# Patient Record
Sex: FEMALE | Race: WHITE | NOT HISPANIC OR LATINO | Employment: OTHER | URBAN - NONMETROPOLITAN AREA
[De-identification: names, ages, dates, MRNs, and addresses within clinical notes are randomized per-mention and may not be internally consistent; named-entity substitution may affect disease eponyms.]

---

## 2017-01-10 ENCOUNTER — TRANSFERRED RECORDS (OUTPATIENT)
Dept: HEALTH INFORMATION MANAGEMENT | Facility: OTHER | Age: 52
End: 2017-01-10

## 2017-01-18 ENCOUNTER — APPOINTMENT (RX ONLY)
Dept: URBAN - METROPOLITAN AREA OTHER 12 | Facility: OTHER | Age: 52
Setting detail: DERMATOLOGY
End: 2017-01-18

## 2017-01-18 DIAGNOSIS — L82.1 OTHER SEBORRHEIC KERATOSIS: ICD-10-CM

## 2017-01-18 DIAGNOSIS — D22 MELANOCYTIC NEVI: ICD-10-CM

## 2017-01-18 DIAGNOSIS — D485 NEOPLASM OF UNCERTAIN BEHAVIOR OF SKIN: ICD-10-CM

## 2017-01-18 DIAGNOSIS — L81.4 OTHER MELANIN HYPERPIGMENTATION: ICD-10-CM

## 2017-01-18 PROBLEM — D48.5 NEOPLASM OF UNCERTAIN BEHAVIOR OF SKIN: Status: ACTIVE | Noted: 2017-01-18

## 2017-01-18 PROBLEM — D22.9 MELANOCYTIC NEVI, UNSPECIFIED: Status: ACTIVE | Noted: 2017-01-18

## 2017-01-18 PROCEDURE — 11100: CPT

## 2017-01-18 PROCEDURE — ? TREATMENT REGIMEN

## 2017-01-18 PROCEDURE — 99203 OFFICE O/P NEW LOW 30 MIN: CPT | Mod: 25

## 2017-01-18 PROCEDURE — ? COUNSELING

## 2017-01-18 PROCEDURE — ? BIOPSY BY SHAVE METHOD

## 2017-01-18 ASSESSMENT — LOCATION DETAILED DESCRIPTION DERM
LOCATION DETAILED: RIGHT INFERIOR MEDIAL UPPER BACK
LOCATION DETAILED: RIGHT PROXIMAL PRETIBIAL REGION
LOCATION DETAILED: LEFT PROXIMAL PRETIBIAL REGION
LOCATION DETAILED: SUPERIOR LUMBAR SPINE

## 2017-01-18 ASSESSMENT — LOCATION SIMPLE DESCRIPTION DERM
LOCATION SIMPLE: LOWER BACK
LOCATION SIMPLE: RIGHT PRETIBIAL REGION
LOCATION SIMPLE: LEFT PRETIBIAL REGION
LOCATION SIMPLE: RIGHT UPPER BACK

## 2017-01-18 ASSESSMENT — LOCATION ZONE DERM
LOCATION ZONE: LEG
LOCATION ZONE: TRUNK

## 2017-01-18 NOTE — PROCEDURE: TREATMENT REGIMEN
Otc Regimen: CeraVe cream daily (coupon given)
Plan: Recommended annual skin exams or sooner if any changes noted
Detail Level: Generalized

## 2017-01-18 NOTE — HPI: FULL BODY SKIN EXAMINATION
What Is The Reason For Today's Visit?: Full Body Skin Examination
What Is The Reason For Today's Visit? (Being Monitored For X): the development of new lesions
Additional History: Patient stated she has no lesions of concern at this time

## 2017-01-18 NOTE — PROCEDURE: BIOPSY BY SHAVE METHOD
Render Post-Care Instructions In Note?: no
X Size Of Lesion In Cm: 0
Silver Nitrate Text: The wound bed was treated with silver nitrate after the biopsy was performed.
Hemostasis: Drysol
Cryotherapy Text: The wound bed was treated with cryotherapy after the biopsy was performed.
Wound Care: Vaseline
Notification Instructions: Patient will be notified of biopsy results. However, patient instructed to call the office if not contacted within 2 weeks.
Electrodesiccation Text: The wound bed was treated with electrodesiccation after the biopsy was performed.
Dressing: bandage
Biopsy Method: Personna blade
Anesthesia Type: 2% lidocaine with epinephrine and a 1:10 solution of 8.4% sodium bicarbonate
Lab: [id number]
Detail Level: Detailed
Billing Type: Third-Party Bill
Consent: Written consent was obtained and risks were reviewed including but not limited to scarring, infection, bleeding, scabbing, incomplete removal, nerve damage and allergy to anesthesia.
Anesthesia Volume In Cc: 1.4
Curettage Text: The wound bed was treated with curettage after the biopsy was performed.
Biopsy Type: H and E
Type Of Destruction Used: Curettage
Post-Care Instructions: I reviewed with the patient in detail post-care instructions. Patient is to keep the biopsy site dry overnight, and then apply Vaseline and band aid until healed.
Lab Facility: [id number]
Electrodesiccation And Curettage Text: The wound bed was treated with electrodesiccation and curettage after the biopsy was performed.

## 2017-02-01 ENCOUNTER — TRANSFERRED RECORDS (OUTPATIENT)
Dept: HEALTH INFORMATION MANAGEMENT | Facility: OTHER | Age: 52
End: 2017-02-01

## 2017-02-20 ENCOUNTER — TRANSFERRED RECORDS (OUTPATIENT)
Dept: HEALTH INFORMATION MANAGEMENT | Facility: OTHER | Age: 52
End: 2017-02-20

## 2017-04-25 ENCOUNTER — APPOINTMENT (RX ONLY)
Dept: URBAN - METROPOLITAN AREA OTHER 12 | Facility: OTHER | Age: 52
Setting detail: DERMATOLOGY
End: 2017-04-25

## 2017-04-25 DIAGNOSIS — Z87.2 PERSONAL HISTORY OF DISEASES OF THE SKIN AND SUBCUTANEOUS TISSUE: ICD-10-CM

## 2017-04-25 PROCEDURE — 99212 OFFICE O/P EST SF 10 MIN: CPT

## 2017-04-25 ASSESSMENT — LOCATION DETAILED DESCRIPTION DERM: LOCATION DETAILED: SUPERIOR LUMBAR SPINE

## 2017-04-25 ASSESSMENT — LOCATION ZONE DERM: LOCATION ZONE: TRUNK

## 2017-04-25 ASSESSMENT — LOCATION SIMPLE DESCRIPTION DERM: LOCATION SIMPLE: LOWER BACK

## 2017-12-08 ENCOUNTER — APPOINTMENT (RX ONLY)
Dept: URBAN - METROPOLITAN AREA OTHER 12 | Facility: OTHER | Age: 52
Setting detail: DERMATOLOGY
End: 2017-12-08

## 2017-12-08 DIAGNOSIS — D485 NEOPLASM OF UNCERTAIN BEHAVIOR OF SKIN: ICD-10-CM

## 2017-12-08 DIAGNOSIS — D22 MELANOCYTIC NEVI: ICD-10-CM

## 2017-12-08 PROBLEM — D22.5 MELANOCYTIC NEVI OF TRUNK: Status: ACTIVE | Noted: 2017-12-08

## 2017-12-08 PROBLEM — D48.5 NEOPLASM OF UNCERTAIN BEHAVIOR OF SKIN: Status: ACTIVE | Noted: 2017-12-08

## 2017-12-08 PROCEDURE — ? COUNSELING

## 2017-12-08 PROCEDURE — ? BIOPSY BY SHAVE METHOD

## 2017-12-08 PROCEDURE — 99212 OFFICE O/P EST SF 10 MIN: CPT | Mod: 25

## 2017-12-08 PROCEDURE — 11100: CPT

## 2017-12-08 ASSESSMENT — LOCATION SIMPLE DESCRIPTION DERM
LOCATION SIMPLE: ABDOMEN
LOCATION SIMPLE: UPPER BACK

## 2017-12-08 ASSESSMENT — LOCATION DETAILED DESCRIPTION DERM
LOCATION DETAILED: PERIUMBILICAL SKIN
LOCATION DETAILED: INFERIOR THORACIC SPINE

## 2017-12-08 ASSESSMENT — LOCATION ZONE DERM: LOCATION ZONE: TRUNK

## 2017-12-08 NOTE — PROCEDURE: BIOPSY BY SHAVE METHOD
Type Of Destruction Used: Curettage
Dressing: bandage
Additional Anesthesia Volume In Cc (Will Not Render If 0): 0
Anesthesia Type: 1% lidocaine with epinephrine and a 1:10 solution of 8.4% sodium bicarbonate
Destruction After The Procedure: No
Biopsy Type: H and E
Notification Instructions: Patient will be notified of biopsy results. However, patient instructed to call the office if not contacted within 2 weeks.
Biopsy Method: Personna blade
Cryotherapy Text: The wound bed was treated with cryotherapy after the biopsy was performed.
Lab: -[id number]
Post-Care Instructions: I reviewed with the patient in detail post-care instructions. Patient is to keep the biopsy site dry overnight, and then apply Vaseline and band aid until healed.
Hemostasis: Drysol
Billing Type: Third-Party Bill
Electrodesiccation And Curettage Text: The wound bed was treated with electrodesiccation and curettage after the biopsy was performed.
Curettage Text: The wound bed was treated with curettage after the biopsy was performed.
Silver Nitrate Text: The wound bed was treated with silver nitrate after the biopsy was performed.
Wound Care: Vaseline
Detail Level: Detailed
Consent: Written consent was obtained and risks were reviewed including but not limited to scarring, infection, bleeding, scabbing, incomplete removal, nerve damage and allergy to anesthesia.
Anesthesia Volume In Cc: 1.4
Electrodesiccation Text: The wound bed was treated with electrodesiccation after the biopsy was performed.
Lab Facility: [id number]

## 2017-12-08 NOTE — HPI: FULL BODY SKIN EXAMINATION
How Severe Are Your Spot(S)?: mild
What Is The Reason For Today's Visit?: Full Body Skin Examination
What Is The Reason For Today's Visit? (Being Monitored For X): surveillance against the recurrence of atypical nevi
Additional History: Patient states she had knee replacement surgery on both knees and area on her right knee would bleed and become bothersome, but has since healed and left a scar.

## 2018-01-01 ENCOUNTER — TRANSFERRED RECORDS (OUTPATIENT)
Dept: MULTI SPECIALTY CLINIC | Facility: CLINIC | Age: 53
End: 2018-01-01

## 2018-02-21 ENCOUNTER — TRANSFERRED RECORDS (OUTPATIENT)
Dept: HEALTH INFORMATION MANAGEMENT | Facility: OTHER | Age: 53
End: 2018-02-21

## 2019-02-25 ENCOUNTER — TRANSFERRED RECORDS (OUTPATIENT)
Dept: HEALTH INFORMATION MANAGEMENT | Facility: OTHER | Age: 54
End: 2019-02-25

## 2020-03-06 ENCOUNTER — TRANSFERRED RECORDS (OUTPATIENT)
Dept: HEALTH INFORMATION MANAGEMENT | Facility: OTHER | Age: 55
End: 2020-03-06

## 2020-08-17 ENCOUNTER — TRANSFERRED RECORDS (OUTPATIENT)
Dept: HEALTH INFORMATION MANAGEMENT | Facility: OTHER | Age: 55
End: 2020-08-17

## 2020-08-19 ENCOUNTER — APPOINTMENT (RX ONLY)
Dept: URBAN - METROPOLITAN AREA OTHER 11 | Facility: OTHER | Age: 55
Setting detail: DERMATOLOGY
End: 2020-08-19

## 2020-08-19 DIAGNOSIS — Z12.83 ENCOUNTER FOR SCREENING FOR MALIGNANT NEOPLASM OF SKIN: ICD-10-CM

## 2020-08-19 DIAGNOSIS — D22 MELANOCYTIC NEVI: ICD-10-CM

## 2020-08-19 DIAGNOSIS — L72.0 EPIDERMAL CYST: ICD-10-CM

## 2020-08-19 DIAGNOSIS — D18.0 HEMANGIOMA: ICD-10-CM

## 2020-08-19 DIAGNOSIS — D485 NEOPLASM OF UNCERTAIN BEHAVIOR OF SKIN: ICD-10-CM

## 2020-08-19 DIAGNOSIS — L82.1 OTHER SEBORRHEIC KERATOSIS: ICD-10-CM

## 2020-08-19 PROBLEM — D18.01 HEMANGIOMA OF SKIN AND SUBCUTANEOUS TISSUE: Status: ACTIVE | Noted: 2020-08-19

## 2020-08-19 PROBLEM — D22.9 MELANOCYTIC NEVI, UNSPECIFIED: Status: ACTIVE | Noted: 2020-08-19

## 2020-08-19 PROBLEM — D48.5 NEOPLASM OF UNCERTAIN BEHAVIOR OF SKIN: Status: ACTIVE | Noted: 2020-08-19

## 2020-08-19 PROCEDURE — 99214 OFFICE O/P EST MOD 30 MIN: CPT | Mod: 25

## 2020-08-19 PROCEDURE — 11102 TANGNTL BX SKIN SINGLE LES: CPT

## 2020-08-19 PROCEDURE — ? BIOPSY BY SHAVE METHOD

## 2020-08-19 PROCEDURE — 11103 TANGNTL BX SKIN EA SEP/ADDL: CPT

## 2020-08-19 PROCEDURE — ? COUNSELING

## 2020-08-19 ASSESSMENT — LOCATION DETAILED DESCRIPTION DERM
LOCATION DETAILED: RIGHT CENTRAL FRONTAL SCALP
LOCATION DETAILED: LEFT DORSAL FOOT
LOCATION DETAILED: LEFT ANTERIOR AXILLA
LOCATION DETAILED: RIGHT SUPERIOR UPPER BACK
LOCATION DETAILED: RIGHT LATERAL ABDOMEN
LOCATION DETAILED: EPIGASTRIC SKIN
LOCATION DETAILED: LEFT INFERIOR MEDIAL MALAR CHEEK
LOCATION DETAILED: RIGHT INFERIOR LATERAL LOWER BACK
LOCATION DETAILED: LEFT INFERIOR MEDIAL LOWER BACK

## 2020-08-19 ASSESSMENT — LOCATION ZONE DERM
LOCATION ZONE: SCALP
LOCATION ZONE: FEET
LOCATION ZONE: AXILLAE
LOCATION ZONE: FACE
LOCATION ZONE: TRUNK

## 2020-08-19 ASSESSMENT — LOCATION SIMPLE DESCRIPTION DERM
LOCATION SIMPLE: LEFT FOOT
LOCATION SIMPLE: RIGHT UPPER BACK
LOCATION SIMPLE: ABDOMEN
LOCATION SIMPLE: SCALP
LOCATION SIMPLE: LEFT ANTERIOR AXILLA
LOCATION SIMPLE: LEFT CHEEK
LOCATION SIMPLE: LEFT LOWER BACK
LOCATION SIMPLE: RIGHT LOWER BACK

## 2021-02-12 ENCOUNTER — APPOINTMENT (RX ONLY)
Dept: URBAN - METROPOLITAN AREA OTHER 11 | Facility: OTHER | Age: 56
Setting detail: DERMATOLOGY
End: 2021-02-12

## 2021-02-12 DIAGNOSIS — H02.6 XANTHELASMA OF EYELID: ICD-10-CM

## 2021-02-12 DIAGNOSIS — L82.1 OTHER SEBORRHEIC KERATOSIS: ICD-10-CM

## 2021-02-12 DIAGNOSIS — D18.0 HEMANGIOMA: ICD-10-CM

## 2021-02-12 DIAGNOSIS — D22 MELANOCYTIC NEVI: ICD-10-CM

## 2021-02-12 PROBLEM — D18.01 HEMANGIOMA OF SKIN AND SUBCUTANEOUS TISSUE: Status: ACTIVE | Noted: 2021-02-12

## 2021-02-12 PROBLEM — H02.64 XANTHELASMA OF LEFT UPPER EYELID: Status: ACTIVE | Noted: 2021-02-12

## 2021-02-12 PROBLEM — D22.5 MELANOCYTIC NEVI OF TRUNK: Status: ACTIVE | Noted: 2021-02-12

## 2021-02-12 PROCEDURE — ? TREATMENT REGIMEN

## 2021-02-12 PROCEDURE — ? OTHER

## 2021-02-12 PROCEDURE — 99213 OFFICE O/P EST LOW 20 MIN: CPT

## 2021-02-12 PROCEDURE — ? COUNSELING

## 2021-02-12 ASSESSMENT — LOCATION SIMPLE DESCRIPTION DERM
LOCATION SIMPLE: RIGHT BREAST
LOCATION SIMPLE: LEFT SUPERIOR EYELID
LOCATION SIMPLE: LEFT BREAST
LOCATION SIMPLE: ABDOMEN
LOCATION SIMPLE: LEFT UPPER ARM
LOCATION SIMPLE: LEFT LOWER BACK
LOCATION SIMPLE: UPPER BACK

## 2021-02-12 ASSESSMENT — LOCATION DETAILED DESCRIPTION DERM
LOCATION DETAILED: LEFT INFRAMAMMARY CREASE (INNER QUADRANT)
LOCATION DETAILED: LEFT SUPERIOR LATERAL MIDBACK
LOCATION DETAILED: SUPERIOR THORACIC SPINE
LOCATION DETAILED: PERIUMBILICAL SKIN
LOCATION DETAILED: LEFT ANTERIOR PROXIMAL UPPER ARM
LOCATION DETAILED: LEFT MEDIAL SUPERIOR EYELID
LOCATION DETAILED: RIGHT INFRAMAMMARY CREASE (INNER QUADRANT)

## 2021-02-12 ASSESSMENT — LOCATION ZONE DERM
LOCATION ZONE: ARM
LOCATION ZONE: EYELID
LOCATION ZONE: TRUNK

## 2021-02-12 NOTE — PROCEDURE: OTHER
Note Text (......Xxx Chief Complaint.): This diagnosis correlates with the
Render Risk Assessment In Note?: no
Other (Free Text): Follow up with PCP to monitor cholesterol
Detail Level: Zone

## 2021-02-15 ENCOUNTER — TRANSFERRED RECORDS (OUTPATIENT)
Dept: HEALTH INFORMATION MANAGEMENT | Facility: OTHER | Age: 56
End: 2021-02-15

## 2021-03-29 ENCOUNTER — TRANSFERRED RECORDS (OUTPATIENT)
Dept: HEALTH INFORMATION MANAGEMENT | Facility: OTHER | Age: 56
End: 2021-03-29

## 2021-05-19 ENCOUNTER — TRANSFERRED RECORDS (OUTPATIENT)
Dept: HEALTH INFORMATION MANAGEMENT | Facility: OTHER | Age: 56
End: 2021-05-19

## 2021-05-19 ENCOUNTER — TRANSFERRED RECORDS (OUTPATIENT)
Dept: MULTI SPECIALTY CLINIC | Facility: CLINIC | Age: 56
End: 2021-05-19

## 2021-05-19 LAB — PAP SMEAR - HIM PATIENT REPORTED: NEGATIVE

## 2021-09-13 ENCOUNTER — MYC MEDICAL ADVICE (OUTPATIENT)
Dept: FAMILY MEDICINE | Facility: OTHER | Age: 56
End: 2021-09-13

## 2021-09-13 ENCOUNTER — OFFICE VISIT (OUTPATIENT)
Dept: FAMILY MEDICINE | Facility: OTHER | Age: 56
End: 2021-09-13
Payer: MEDICAID

## 2021-09-13 VITALS
DIASTOLIC BLOOD PRESSURE: 86 MMHG | SYSTOLIC BLOOD PRESSURE: 132 MMHG | RESPIRATION RATE: 18 BRPM | BODY MASS INDEX: 33.71 KG/M2 | OXYGEN SATURATION: 99 % | HEIGHT: 67 IN | HEART RATE: 88 BPM | TEMPERATURE: 97.6 F | WEIGHT: 214.8 LBS

## 2021-09-13 DIAGNOSIS — Z00.00 ROUTINE GENERAL MEDICAL EXAMINATION AT A HEALTH CARE FACILITY: Primary | ICD-10-CM

## 2021-09-13 DIAGNOSIS — N95.1 MENOPAUSAL SYNDROME (HOT FLASHES): ICD-10-CM

## 2021-09-13 PROCEDURE — G0463 HOSPITAL OUTPT CLINIC VISIT: HCPCS | Performed by: NURSE PRACTITIONER

## 2021-09-13 PROCEDURE — 99202 OFFICE O/P NEW SF 15 MIN: CPT | Performed by: NURSE PRACTITIONER

## 2021-09-13 RX ORDER — DROSPIRENONE AND ESTRADIOL 1; .5 MG/1; MG/1
1 TABLET, FILM COATED ORAL DAILY
Qty: 90 TABLET | Refills: 3 | Status: SHIPPED | OUTPATIENT
Start: 2021-09-13 | End: 2022-05-20

## 2021-09-13 RX ORDER — ACYCLOVIR 400 MG/1
TABLET ORAL
COMMUNITY
End: 2022-05-20

## 2021-09-13 RX ORDER — TERCONAZOLE 8 MG/G
CREAM VAGINAL
COMMUNITY
Start: 2021-07-22 | End: 2022-07-27

## 2021-09-13 RX ORDER — DROSPIRENONE AND ESTRADIOL 1; .5 MG/1; MG/1
TABLET, FILM COATED ORAL
COMMUNITY
Start: 2021-09-13 | End: 2021-09-13

## 2021-09-13 RX ORDER — CLOTRIMAZOLE AND BETAMETHASONE DIPROPIONATE 10; .64 MG/G; MG/G
CREAM TOPICAL PRN
COMMUNITY
Start: 2021-07-22 | End: 2022-07-27

## 2021-09-13 ASSESSMENT — PAIN SCALES - GENERAL: PAINLEVEL: NO PAIN (0)

## 2021-09-13 ASSESSMENT — MIFFLIN-ST. JEOR: SCORE: 1601.96

## 2021-09-13 NOTE — PROGRESS NOTES
HPI:    Johanna Cm is a 56 year old female who presents to clinic today for for clinic visit to establish care.  She also needs medication refills today.  She moved from [place] to [place] in June 2021 with her  for [employer].  She reports her last physical exam was just prior to her moved to [place], likely in May.  She had a mammogram Pap smear at that time.  These were normal.  Last colonoscopy was at age 51 and was normal.  She does not get routine flu shot or Covid vaccines.  She is unsure when her last tetanus shot was.  She states she has not had her menses for 3 months.  She is currently taking Angeliq for menopause symptoms.  She will request her medical records from [place].    History reviewed. No pertinent past medical history.    Past Surgical History:   Procedure Laterality Date     BILATERAL KNEE ARTHROSCOPY Bilateral      right knee arthropscopy Right        Family History   Problem Relation Age of Onset     No Known Problems Mother      No Known Problems Father      No Known Problems Sister      Hypertension Brother      Heart Disease Other        Social History     Socioeconomic History     Marital status:      Spouse name: Not on file     Number of children: Not on file     Years of education: Not on file     Highest education level: Not on file   Occupational History     Not on file   Tobacco Use     Smoking status: Never Smoker     Smokeless tobacco: Never Used   Vaping Use     Vaping Use: Never used   Substance and Sexual Activity     Alcohol use: Yes     Drug use: Not on file     Sexual activity: Yes     Partners: Male   Other Topics Concern     Not on file   Social History Narrative    , retired. 1 biological son. 3 step children     Social Determinants of Health     Financial Resource Strain:      Difficulty of Paying Living Expenses:    Food Insecurity:      Worried About Running Out of Food in the Last Year:      Ran Out of Food in the Last Year:   "  Transportation Needs:      Lack of Transportation (Medical):      Lack of Transportation (Non-Medical):    Physical Activity:      Days of Exercise per Week:      Minutes of Exercise per Session:    Stress:      Feeling of Stress :    Social Connections:      Frequency of Communication with Friends and Family:      Frequency of Social Gatherings with Friends and Family:      Attends [employer] Services:      Active Member of Clubs or Organizations:      Attends Club or Organization Meetings:      Marital Status:    Intimate Partner Violence:      Fear of Current or Ex-Partner:      Emotionally Abused:      Physically Abused:      Sexually Abused:        Current Outpatient Medications   Medication Sig Dispense Refill     acyclovir (ZOVIRAX) 400 MG tablet        clotrimazole-betamethasone (LOTRISONE) 1-0.05 % external cream apply TO affected area two TO three TIMES DAILY       drospirenone-estradiol (ANGELIQ) 0.5-1 MG tablet Take 1 tablet by mouth daily 90 tablet 3     terconazole (TERAZOL 3) 0.8 % vaginal cream place one applicator-full into THE VAGINA AT BEDTIME FOR 3 DAYS         Allergies   Allergen Reactions     Cats      Dust Mites        ROS:  Pertinent positives and negatives are noted in HPI.    EXAM:  /86   Pulse 88   Temp 97.6  F (36.4  C) (Tympanic)   Resp 18   Ht 1.702 m (5' 7\")   Wt 97.4 kg (214 lb 12.8 oz)   SpO2 99%   Breastfeeding No   BMI 33.64 kg/m    General appearance: well appearing female, in no acute distress  Head: normocephalic, atraumatic  Ears: TM's with cone of light, no erythema, canals clear bilaterally  Eyes: conjunctivae normal  Psychological: normal affect, alert and pleasant    ASSESSMENT AND PLAN:    1. Routine general medical examination at a health care facility    2. Menopausal syndrome (hot flashes)      Release information forms were completed to obtain her medical records from [place].  Refill medication completed per request today.  She will follow-up in May for " routine physical, sooner if any concerns.      MANUEL Ruiz CNP..................9/13/2021 8:50 AM

## 2021-09-13 NOTE — NURSING NOTE
Pt presents to clinic today for annual physical and Establish care. Pt is up to date on her mammogram and pap smears.       Medication Reconciliation: complete  Alyssa Mak LPN

## 2021-09-22 ENCOUNTER — OFFICE VISIT (OUTPATIENT)
Dept: FAMILY MEDICINE | Facility: OTHER | Age: 56
End: 2021-09-22
Attending: PHYSICIAN ASSISTANT
Payer: MEDICAID

## 2021-09-22 VITALS
WEIGHT: 211 LBS | BODY MASS INDEX: 33.05 KG/M2 | TEMPERATURE: 97.4 F | SYSTOLIC BLOOD PRESSURE: 110 MMHG | DIASTOLIC BLOOD PRESSURE: 70 MMHG | OXYGEN SATURATION: 98 % | HEART RATE: 64 BPM | RESPIRATION RATE: 18 BRPM

## 2021-09-22 DIAGNOSIS — R07.89 CHEST WALL PAIN: Primary | ICD-10-CM

## 2021-09-22 PROCEDURE — G0463 HOSPITAL OUTPT CLINIC VISIT: HCPCS | Performed by: FAMILY MEDICINE

## 2021-09-22 PROCEDURE — 99213 OFFICE O/P EST LOW 20 MIN: CPT | Performed by: FAMILY MEDICINE

## 2021-09-22 ASSESSMENT — PAIN SCALES - GENERAL: PAINLEVEL: MILD PAIN (2)

## 2021-09-22 NOTE — NURSING NOTE
"Patient presents to the clinic for left side chest/rib/shoulder pain on and off over the past 5 days.  Unable to locate [place] facility, cardiac history present.      FOOD SECURITY SCREENING QUESTIONS  Hunger Vital Signs:  Within the past 12 months we worried whether our food would run out before we got money to buy more. Never  Within the past 12 months the food we bought just didn't last and we didn't have money to get more. Never    Advance Care Directive on file? no  Advance Care Directive provided to patient? Declined.    Chief Complaint   Patient presents with     Musculoskeletal Problem       Initial /70 (BP Location: Right arm, Patient Position: Sitting, Cuff Size: Adult Large)   Pulse 64   Temp 97.4  F (36.3  C) (Temporal)   Resp 18   Wt 95.7 kg (211 lb)   SpO2 98%   BMI 33.05 kg/m   Estimated body mass index is 33.05 kg/m  as calculated from the following:    Height as of 9/13/21: 1.702 m (5' 7\").    Weight as of this encounter: 95.7 kg (211 lb).  Medication Reconciliation: complete    Marli Sandoval LPN       "

## 2021-09-22 NOTE — PROGRESS NOTES
"Nursing Notes:   Marli Sandoval LPN  9/22/2021  2:37 PM  Signed  Patient presents to the clinic for left side chest/rib/shoulder pain on and off over the past 5 days.  Unable to locate [place] facility, cardiac history present.      FOOD SECURITY SCREENING QUESTIONS  Hunger Vital Signs:  Within the past 12 months we worried whether our food would run out before we got money to buy more. Never  Within the past 12 months the food we bought just didn't last and we didn't have money to get more. Never    Advance Care Directive on file? no  Advance Care Directive provided to patient? Declined.    Chief Complaint   Patient presents with     Musculoskeletal Problem       Initial /70 (BP Location: Right arm, Patient Position: Sitting, Cuff Size: Adult Large)   Pulse 64   Temp 97.4  F (36.3  C) (Temporal)   Resp 18   Wt 95.7 kg (211 lb)   SpO2 98%   BMI 33.05 kg/m   Estimated body mass index is 33.05 kg/m  as calculated from the following:    Height as of 9/13/21: 1.702 m (5' 7\").    Weight as of this encounter: 95.7 kg (211 lb).  Medication Reconciliation: complete    Marli Sandoval LPN            Assessment & Plan       ICD-10-CM    1. Chest wall pain  R07.89        Chest pain to palpation, fitting for chest wall pain  Likely aggravated lifting items due to moved from [place].  Does not at all seem cardiac. Therefore EKG not obtained today  Recommend trying naproxen for and 40 mg twice daily for 5 days to see if this reduces pain.    Incidentally mentioned \"all my friends are dying of COVID.\" She is not vaccinated. I recommended mitigation measures like masking if she chooses against vaccination given that hospitals are full currently    Follow up as needed     Gigi James MD     [facility]      SUBJECTIVE:  56 year old female presents for left dull pain for 5 days.  Worse with a deep breath  Sometimes has no pain  More pain with lifting  No cough, fever, SOB  Pain not specifically " with exertion, but with arm or torso use      REVIEW OF SYSTEMS:    Pertinent items are noted in HPI.    Current Outpatient Medications   Medication Sig Dispense Refill     acyclovir (ZOVIRAX) 400 MG tablet        clotrimazole-betamethasone (LOTRISONE) 1-0.05 % external cream apply TO affected area two TO three TIMES DAILY       drospirenone-estradiol (ANGELIQ) 0.5-1 MG tablet Take 1 tablet by mouth daily 90 tablet 3     terconazole (TERAZOL 3) 0.8 % vaginal cream place one applicator-full into THE VAGINA AT BEDTIME FOR 3 DAYS       Allergies   Allergen Reactions     Cats      Dust Mites        OBJECTIVE:  /70 (BP Location: Right arm, Patient Position: Sitting, Cuff Size: Adult Large)   Pulse 64   Temp 97.4  F (36.3  C) (Temporal)   Resp 18   Wt 95.7 kg (211 lb)   SpO2 98%   BMI 33.05 kg/m      EXAM:  General Appearance: Alert. No acute distress  Chest/Respiratory Exam: Clear to auscultation bilaterally  Cardiovascular Exam: Regular rate and rhythm. S1, S2, no murmur, gallop, or rubs.  Musculoskeletal: Tender to palpation over left eighth and ninth ribs under breast. No pain with compression of sternum. No pain with compression of ribs laterally towards midline. Minimal pain with twisting of torso. Pain noticed with engagement of pectoralis and latissimus dorsi muscles.  Extremities: 2+ pedal pulses.  No lower extremity edema.  Psychiatric: Normal affect and mentation

## 2021-10-17 ENCOUNTER — HEALTH MAINTENANCE LETTER (OUTPATIENT)
Age: 56
End: 2021-10-17

## 2022-04-07 ENCOUNTER — HOSPITAL ENCOUNTER (OUTPATIENT)
Dept: MAMMOGRAPHY | Facility: OTHER | Age: 57
Discharge: HOME | End: 2022-04-07
Attending: NURSE PRACTITIONER | Admitting: NURSE PRACTITIONER
Payer: COMMERCIAL

## 2022-04-07 DIAGNOSIS — Z12.31 VISIT FOR SCREENING MAMMOGRAM: ICD-10-CM

## 2022-04-07 PROCEDURE — 77067 SCR MAMMO BI INCL CAD: CPT

## 2022-04-21 ENCOUNTER — E-VISIT (OUTPATIENT)
Dept: FAMILY MEDICINE | Facility: OTHER | Age: 57
End: 2022-04-21
Attending: NURSE PRACTITIONER
Payer: COMMERCIAL

## 2022-04-21 DIAGNOSIS — J01.90 ACUTE SINUSITIS WITH SYMPTOMS > 10 DAYS: Primary | ICD-10-CM

## 2022-04-21 PROCEDURE — 99421 OL DIG E/M SVC 5-10 MIN: CPT | Performed by: FAMILY MEDICINE

## 2022-04-21 NOTE — PATIENT INSTRUCTIONS
Dear Johanna Cm    After reviewing your responses, I've been able to diagnose you with?a sinus infection caused by bacteria.?     Based on your responses and diagnosis, I have prescribed Aygmentin to treat your symptoms. I have sent this to your pharmacy.?     It is also important to stay well hydrated, get lots of rest and take over-the-counter decongestants,?tylenol?or ibuprofen if you?are able to?take those medications per your primary care provider to help relieve discomfort.?     It is important that you take?all of?your prescribed medication even if your symptoms are improving after a few doses.? Taking?all of?your medicine helps prevent the symptoms from returning.?     If your symptoms worsen, you develop severe headache, vomiting, high fever (>102), or are not improving in 7 days, please contact your primary care provider for an appointment or visit any of our convenient Walk-in Care or Urgent Care Centers to be seen which can be found on our website?here.?     Thanks again for choosing?us?as your health care partner,?   ?  Ryan Ruvalcaba MD?

## 2022-05-20 ENCOUNTER — OFFICE VISIT (OUTPATIENT)
Dept: FAMILY MEDICINE | Facility: OTHER | Age: 57
End: 2022-05-20
Attending: NURSE PRACTITIONER
Payer: COMMERCIAL

## 2022-05-20 VITALS
TEMPERATURE: 97.6 F | SYSTOLIC BLOOD PRESSURE: 102 MMHG | BODY MASS INDEX: 35.47 KG/M2 | WEIGHT: 226 LBS | OXYGEN SATURATION: 98 % | HEIGHT: 67 IN | RESPIRATION RATE: 18 BRPM | DIASTOLIC BLOOD PRESSURE: 84 MMHG | HEART RATE: 80 BPM

## 2022-05-20 DIAGNOSIS — B00.1 RECURRENT COLD SORES: ICD-10-CM

## 2022-05-20 DIAGNOSIS — L98.9 SKIN LESION: ICD-10-CM

## 2022-05-20 DIAGNOSIS — R20.0 NUMBNESS OF TOES: ICD-10-CM

## 2022-05-20 DIAGNOSIS — Z13.29 SCREENING FOR THYROID DISORDER: ICD-10-CM

## 2022-05-20 DIAGNOSIS — E66.812 CLASS 2 OBESITY WITHOUT SERIOUS COMORBIDITY WITH BODY MASS INDEX (BMI) OF 35.0 TO 35.9 IN ADULT, UNSPECIFIED OBESITY TYPE: ICD-10-CM

## 2022-05-20 DIAGNOSIS — Z00.00 ROUTINE HISTORY AND PHYSICAL EXAMINATION OF ADULT: Primary | ICD-10-CM

## 2022-05-20 DIAGNOSIS — Z13.220 LIPID SCREENING: ICD-10-CM

## 2022-05-20 DIAGNOSIS — N95.1 MENOPAUSAL SYNDROME (HOT FLASHES): ICD-10-CM

## 2022-05-20 LAB
ALBUMIN SERPL-MCNC: 4.4 G/DL (ref 3.5–5.7)
ALP SERPL-CCNC: 97 U/L (ref 34–104)
ALT SERPL W P-5'-P-CCNC: 13 U/L (ref 7–52)
ANION GAP SERPL CALCULATED.3IONS-SCNC: 6 MMOL/L (ref 3–14)
AST SERPL W P-5'-P-CCNC: 16 U/L (ref 13–39)
BILIRUB SERPL-MCNC: 0.4 MG/DL (ref 0.3–1)
BUN SERPL-MCNC: 12 MG/DL (ref 7–25)
CALCIUM SERPL-MCNC: 9.6 MG/DL (ref 8.6–10.3)
CHLORIDE BLD-SCNC: 106 MMOL/L (ref 98–107)
CHOLEST SERPL-MCNC: 178 MG/DL
CO2 SERPL-SCNC: 28 MMOL/L (ref 21–31)
CREAT SERPL-MCNC: 0.74 MG/DL (ref 0.6–1.2)
FASTING STATUS PATIENT QL REPORTED: YES
GFR SERPL CREATININE-BSD FRML MDRD: >90 ML/MIN/1.73M2
GLUCOSE BLD-MCNC: 80 MG/DL (ref 70–105)
HDLC SERPL-MCNC: 79 MG/DL (ref 23–92)
LDLC SERPL CALC-MCNC: 74 MG/DL
NONHDLC SERPL-MCNC: 99 MG/DL
POTASSIUM BLD-SCNC: 4.3 MMOL/L (ref 3.5–5.1)
PROT SERPL-MCNC: 7.1 G/DL (ref 6.4–8.9)
SODIUM SERPL-SCNC: 140 MMOL/L (ref 134–144)
TRIGL SERPL-MCNC: 124 MG/DL
TSH SERPL DL<=0.005 MIU/L-ACNC: 1.21 MU/L (ref 0.4–4)
VIT B12 SERPL-MCNC: 637 PG/ML (ref 180–914)

## 2022-05-20 PROCEDURE — 83718 ASSAY OF LIPOPROTEIN: CPT | Mod: ZL | Performed by: NURSE PRACTITIONER

## 2022-05-20 PROCEDURE — 84443 ASSAY THYROID STIM HORMONE: CPT | Mod: ZL | Performed by: NURSE PRACTITIONER

## 2022-05-20 PROCEDURE — 80053 COMPREHEN METABOLIC PANEL: CPT | Mod: ZL | Performed by: NURSE PRACTITIONER

## 2022-05-20 PROCEDURE — 99396 PREV VISIT EST AGE 40-64: CPT | Performed by: NURSE PRACTITIONER

## 2022-05-20 PROCEDURE — 36415 COLL VENOUS BLD VENIPUNCTURE: CPT | Mod: ZL | Performed by: NURSE PRACTITIONER

## 2022-05-20 PROCEDURE — 82607 VITAMIN B-12: CPT | Mod: ZL | Performed by: NURSE PRACTITIONER

## 2022-05-20 PROCEDURE — G0463 HOSPITAL OUTPT CLINIC VISIT: HCPCS

## 2022-05-20 RX ORDER — ACYCLOVIR 400 MG/1
400 TABLET ORAL DAILY
Qty: 90 TABLET | Refills: 3 | Status: SHIPPED | OUTPATIENT
Start: 2022-05-20 | End: 2023-05-26

## 2022-05-20 RX ORDER — DROSPIRENONE AND ESTRADIOL 1; .5 MG/1; MG/1
1 TABLET, FILM COATED ORAL DAILY
Qty: 90 TABLET | Refills: 3 | Status: SHIPPED | OUTPATIENT
Start: 2022-05-20 | End: 2023-07-14

## 2022-05-20 ASSESSMENT — PAIN SCALES - GENERAL: PAINLEVEL: NO PAIN (0)

## 2022-05-20 NOTE — PROGRESS NOTES
"  Assessment & Plan   Problem List Items Addressed This Visit        Digestive    Class 2 obesity without serious comorbidity with body mass index (BMI) of 35.0 to 35.9 in adult, unspecified obesity type       Circulatory    Menopausal syndrome (hot flashes)    Relevant Medications    drospirenone-estradiol (ANGELIQ) 0.5-1 MG tablet      Other Visit Diagnoses     Routine history and physical examination of adult    -  Primary    Relevant Orders    Comprehensive Metabolic Panel    Lipid screening        Relevant Orders    Lipid Panel    Screening for thyroid disorder        Relevant Orders    TSH Reflex GH    Recurrent cold sores        Relevant Medications    acyclovir (ZOVIRAX) 400 MG tablet    Numbness of toes        Relevant Orders    Vitamin B12    Skin lesion        Relevant Orders    Adult General Surg Referral      She is unsure of previous immunizations, medical records have been requested from [place].  Labs are pending including comprehensive metabolic panel, lipid panel, TSH and B12.  We will follow-up with lab results when available.  Refer to general surgery for consideration of skin lesion removal.  Refill medications x1 year.  She is interested in medications for weight loss.  We will get labs and she will make an appointment specifically for weight management at her convenience.  Follow-up annually and as needed.               BMI:   Estimated body mass index is 35.4 kg/m  as calculated from the following:    Height as of this encounter: 1.702 m (5' 7\").    Weight as of this encounter: 102.5 kg (226 lb).           No follow-ups on file.    MANUEL Ruiz [facility] is a 56 year old who presents for the following health issues     Healthy Habits:     Getting at least 3 servings of Calcium per day:  Yes    Bi-annual eye exam:  Yes    Dental care twice a year:  Yes    Sleep apnea or symptoms of sleep apnea:  None    Diet:  Regular (no restrictions)    " "Frequency of exercise:  2-3 days/week    Duration of exercise:  15-30 minutes    Taking medications regularly:  Yes    Medication side effects:  None    PHQ-2 Total Score: 0    Additional concerns today:  Yes     She comes in today for routine physical.  Last Pap smear was completed in May 2021.  She states she was getting this annually since she was 17 years old and thought she needed 1 today.  She is getting her medical records sent from [place] for further review but reports she has never had an abnormal Pap smear.  Last colonoscopy was at age 51, 6 years ago.  She reports this was also normal.  She had a mammogram in April 2022.    She has noticed over this past winter that she has been gaining a lot of weight.  She is historically used weight watchers and this worked well for her.  She has been doing a lot of stress eating.  Her  has not been supportive of weight loss despite the need for him to work on his weight and diabetes.    She has a lesion to her left eyelid that she would like to have consult for removal.    Has noted her left fifth toe numb.  She has been seeing chiropractic care for this without improvement.  He stated she made diabetes and should have this evaluated.    Review of Systems   See above      Objective    /84   Pulse 80   Temp 97.6  F (36.4  C)   Resp 18   Ht 1.702 m (5' 7\")   Wt 102.5 kg (226 lb)   SpO2 98%   BMI 35.40 kg/m    Body mass index is 35.4 kg/m .  Physical Exam   GENERAL: healthy, alert and no distress  EYES: Eyes grossly normal to inspection, PERRL and conjunctivae and sclerae normal  HENT: ear canals and TM's normal,   NECK: no adenopathy, no asymmetry, masses, or scars and thyroid normal to palpation  RESP: lungs clear to auscultation - no rales, rhonchi or wheezes  CV: regular rate and rhythm, normal S1 S2, no S3 or S4, no murmur, click or rub, no peripheral edema and peripheral pulses strong  ABDOMEN: soft, nontender, no hepatosplenomegaly, no masses " and bowel sounds normal  MS: no gross musculoskeletal defects noted, no edema  SKIN: no suspicious lesions or rashes. Lesion to left upper eye lid  NEURO: Normal strength and tone, mentation intact and speech normal  PSYCH: mentation appears normal, affect normal/bright

## 2022-05-20 NOTE — NURSING NOTE
Patient presents today for annual physical and pap smear.    Medication Reconciliation Complete    Audrey Tomlin LPN  5/20/2022 8:44 AM

## 2022-05-23 ENCOUNTER — TRANSFERRED RECORDS (OUTPATIENT)
Dept: HEALTH INFORMATION MANAGEMENT | Facility: OTHER | Age: 57
End: 2022-05-23
Payer: COMMERCIAL

## 2022-06-14 ENCOUNTER — OFFICE VISIT (OUTPATIENT)
Dept: SURGERY | Facility: OTHER | Age: 57
End: 2022-06-14
Attending: SURGERY
Payer: COMMERCIAL

## 2022-06-14 VITALS
RESPIRATION RATE: 18 BRPM | HEART RATE: 100 BPM | TEMPERATURE: 98.4 F | BODY MASS INDEX: 34.9 KG/M2 | DIASTOLIC BLOOD PRESSURE: 82 MMHG | WEIGHT: 222.8 LBS | SYSTOLIC BLOOD PRESSURE: 120 MMHG

## 2022-06-14 DIAGNOSIS — L98.9 SKIN LESION: ICD-10-CM

## 2022-06-14 PROCEDURE — G0463 HOSPITAL OUTPT CLINIC VISIT: HCPCS

## 2022-06-14 PROCEDURE — 11310 SHAVE SKIN LESION 0.5 CM/<: CPT | Performed by: SURGERY

## 2022-06-14 PROCEDURE — 88305 TISSUE EXAM BY PATHOLOGIST: CPT

## 2022-06-14 ASSESSMENT — PAIN SCALES - GENERAL: PAINLEVEL: NO PAIN (0)

## 2022-06-14 NOTE — NURSING NOTE
"Chief Complaint   Patient presents with     Derm Problem     Lesion on left eyelid for a little over a year.       Initial /82 (BP Location: Left arm, Patient Position: Sitting, Cuff Size: Adult Large)   Pulse 100   Temp 98.4  F (36.9  C) (Tympanic)   Resp 18   Wt 101.1 kg (222 lb 12.8 oz)   Breastfeeding No   BMI 34.90 kg/m   Estimated body mass index is 34.9 kg/m  as calculated from the following:    Height as of 5/20/22: 1.702 m (5' 7\").    Weight as of this encounter: 101.1 kg (222 lb 12.8 oz).  Medication Reconciliation: complete    Ana Do LPN     TIMEOUT  [place] Protocol    A. Pre-procedure verification complete     1-relevant information / documentation available, reviewed and properly matched to the patient; 2-consent accurate and complete, 3-equipment and supplies available    B. Site marking complete     Site marked if not in continuous attendance with patient    C. TIME OUT completed             Time Out was conducted just prior to starting procedure to verify the eight required elements: 1-patient identity, 2-consent accurate and complete, 3-position, 4-correct side/site marked (if applicable), 5-procedure, 6-relevant images / results properly labeled and displayed (if applicable), 7-antibiotics / irrigation fluids (if applicable), 8-safety precautions.  "

## 2022-06-14 NOTE — PROGRESS NOTES
Procedure Note      Pre/Post Operative Diagnosis:  Left eyelid skin lesion     Procedure:   Removal of Left eyelid skin lesion      Surgeon: Gabriel Mak MD    Local Anesthesia: 1% lidocaine with 0.25% Marcaine with epinephrine    Indication for the procedure:  This is a 57 year old female  patient referred by Maryam Mak with a Left eyelid skin lesion .       After explaining the risks to include bleeding, infection, recurrence or need for reexcision, and scarring the patient wished to proceed.    Procedure:   The area was prepped and draped in usual sterile fashion with ChloraPrep.   After, adequate local anesthesia, an 0.5 cm X 0.4 cm shave was performed to remove a same size skin lesion.  The skin was closed with  Dermabond.      Plan:  The patient will be called to review the pathology. Patient will follow up if there any problems with the wound including redness or drainage.      Gabriel Mak MD....................  6/14/2022   1:08 PM

## 2022-06-14 NOTE — PATIENT INSTRUCTIONS
Your incision was closed with a glue.    It is ok to get the incision wet in the shower on the day after your procedure.     Don't soak in a tub, pool or lake for 1 week.  If you have concerns, please call.

## 2022-06-14 NOTE — NURSING NOTE
"Chief Complaint   Patient presents with     Derm Problem     Lesion on left eyelid for a little over a year.       Initial /82 (BP Location: Left arm, Patient Position: Sitting, Cuff Size: Adult Large)   Pulse 100   Temp 98.4  F (36.9  C) (Tympanic)   Resp 18   Wt 101.1 kg (222 lb 12.8 oz)   Breastfeeding No   BMI 34.90 kg/m   Estimated body mass index is 34.9 kg/m  as calculated from the following:    Height as of 5/20/22: 1.702 m (5' 7\").    Weight as of this encounter: 101.1 kg (222 lb 12.8 oz).  Medication Reconciliation: complete    Ana Do LPN  " MRN [id number]

## 2022-06-15 ENCOUNTER — OFFICE VISIT (OUTPATIENT)
Dept: INTERNAL MEDICINE | Facility: OTHER | Age: 57
End: 2022-06-15
Attending: INTERNAL MEDICINE
Payer: COMMERCIAL

## 2022-06-15 VITALS
DIASTOLIC BLOOD PRESSURE: 74 MMHG | BODY MASS INDEX: 35.28 KG/M2 | TEMPERATURE: 99 F | OXYGEN SATURATION: 99 % | WEIGHT: 224.8 LBS | RESPIRATION RATE: 20 BRPM | SYSTOLIC BLOOD PRESSURE: 128 MMHG | HEIGHT: 67 IN | HEART RATE: 82 BPM

## 2022-06-15 DIAGNOSIS — J01.01 ACUTE RECURRENT MAXILLARY SINUSITIS: Primary | ICD-10-CM

## 2022-06-15 PROCEDURE — G0463 HOSPITAL OUTPT CLINIC VISIT: HCPCS

## 2022-06-15 PROCEDURE — 99214 OFFICE O/P EST MOD 30 MIN: CPT | Performed by: INTERNAL MEDICINE

## 2022-06-15 ASSESSMENT — ENCOUNTER SYMPTOMS
RHINORRHEA: 0
SHORTNESS OF BREATH: 0
SINUS PRESSURE: 1
SINUS PAIN: 1
CHILLS: 1
COUGH: 1
SORE THROAT: 1
FEVER: 0

## 2022-06-15 ASSESSMENT — PAIN SCALES - GENERAL: PAINLEVEL: NO PAIN (0)

## 2022-06-15 NOTE — NURSING NOTE
"Chief Complaint   Patient presents with     not feeling well     Wondering if she has a sinus infection         Initial /74 (BP Location: Right arm, Patient Position: Sitting, Cuff Size: Adult Large)   Pulse 82   Temp 99  F (37.2  C) (Temporal)   Resp 20   Ht 1.695 m (5' 6.75\")   Wt 102 kg (224 lb 12.8 oz)   SpO2 99%   Breastfeeding No   BMI 35.47 kg/m   Estimated body mass index is 35.47 kg/m  as calculated from the following:    Height as of this encounter: 1.695 m (5' 6.75\").    Weight as of this encounter: 102 kg (224 lb 12.8 oz).       FOOD SECURITY SCREENING QUESTIONS:    The next two questions are to help us understand your food security.  If you are feeling you need any assistance in this area, we have resources available to support you today.    Hunger Vital Signs:  Within the past 12 months we worried whether our food would run out before we got money to buy more. Never  Within the past 12 months the food we bought just didn't last and we didn't have money to get more. Never  yes  Advance Care Directive on file? yes      Medication reconciliation complete.      Bret Harris,on 6/15/2022 at 2:57 PM        "

## 2022-06-15 NOTE — PROGRESS NOTES
Assessment & Plan       ICD-10-CM    1. Acute recurrent maxillary sinusitis  J01.01 amoxicillin-clavulanate (AUGMENTIN) 875-125 MG tablet     Patient has had sinus symptoms for the last week and a half.  Initially was more chest cold and throat issues but has turned into more sinus pain and pressure.  Has had recurrent sinus infections.  Last was less than a year ago.  She lived in [place] for almost 50 years and moved to [place] about 2 years ago.  Has had similar symptoms.  The last few days she has been having upper dental pain with chewing and biting.  Has been using Saritha pot and oral vitamins.  This morning's had significant increase in sinus pain and pressure.  She did a home COVID test yesterday and was negative.  Low-grade temperature of 99 today.    Start Augmentin twice daily x10 days.  Follow-up for new or worsening symptoms.    Encouraged regular exercise.     Return for Appointments: [phone], Return as needed for new or worsening symptoms.    Mejia Galarza MD  [facility]     [facility]   Johanna is a 57 year old presenting for the following health issues: ?sinus infection  not feeling well (Wondering if she has a sinus infection)      History of Present Illness       Reason for visit:  Sinus  Symptom onset:  3-7 days ago  Symptom intensity:  Moderate  Symptom progression:  Worsening  Had these symptoms before:  Yes  Has tried/received treatment for these symptoms:  Yes  Previous treatment was successful:  No  What makes it worse:  No  What makes it better:  No    She eats 2-3 servings of fruits and vegetables daily.She consumes 1 sweetened beverage(s) daily.She exercises with enough effort to increase her heart rate 9 or less minutes per day.  She exercises with enough effort to increase her heart rate 4 days per week.   She is taking medications regularly.       Acute Illness  Acute illness concerns:   Onset/Duration: 1 1/2 weeks ago  Symptoms:  Fever: no  Chills/Sweats:  "yes  Headache (location?): YES  Sinus Pressure: YES  Conjunctivitis:  no  Ear Pain: yes  Rhinorrhea: YES  Congestion: no  Sore Throat: YES  Cough: YES-productive of yellow sputum, productive of green sputum  Wheeze: no  Decreased Appetite: no  Nausea: no  Vomiting: no  Diarrhea: no  Dysuria/Freq.: no  Dysuria or Hematuria: no  Fatigue/Achiness: no  Sick/Strep Exposure: no  Therapies tried and outcome: Netti Pot    Review of Systems   Constitutional: Positive for chills. Negative for fever.   HENT: Positive for congestion, dental problem (upper dental pain with chewing), sinus pressure, sinus pain and sore throat. Negative for rhinorrhea and sneezing.    Respiratory: Positive for cough. Negative for shortness of breath.           Objective    /74 (BP Location: Right arm, Patient Position: Sitting, Cuff Size: Adult Large)   Pulse 82   Temp 99  F (37.2  C) (Temporal)   Resp 20   Ht 1.695 m (5' 6.75\")   Wt 102 kg (224 lb 12.8 oz)   SpO2 99%   Breastfeeding No   BMI 35.47 kg/m    Body mass index is 35.47 kg/m .  Physical Exam  HENT:      Head:      Comments: Maxillary sinus tenderness to percussion     Right Ear: Ear canal and external ear normal.      Left Ear: Ear canal and external ear normal.      Ears:      Comments: Serous otitis bilateral     Nose: Congestion present. No rhinorrhea.   Eyes:      General: No scleral icterus.     Conjunctiva/sclera: Conjunctivae normal.   Cardiovascular:      Rate and Rhythm: Normal rate.      Pulses: Normal pulses.   Pulmonary:      Effort: Pulmonary effort is normal.      Breath sounds: Normal breath sounds.   Neurological:      General: No focal deficit present.      Mental Status: She is alert.                    .  ..  "

## 2022-06-16 LAB
PATH REPORT.COMMENTS IMP SPEC: NORMAL
PATH REPORT.FINAL DX SPEC: NORMAL
PHOTO IMAGE: NORMAL

## 2022-07-22 DIAGNOSIS — N95.1 MENOPAUSAL SYNDROME (HOT FLASHES): Primary | ICD-10-CM

## 2022-07-25 NOTE — TELEPHONE ENCOUNTER
Disp Refills Start End KRISHNA   terconazole (TERAZOL 3) 0.8 % vaginal cream   7/22/2021  --   Sig: Using as needed   Class: Historical      Disp Refills Start End KRISHNA   clotrimazole-betamethasone (LOTRISONE) 1-0.05 % external cream   7/22/2021  --   Sig - Route: Apply topically as needed - Topical   Class: Historical         LOV: 6/15/2022  Future Office visit: No future appointment scheduled at this time.    Routing refill request to provider for review/approval.  Per Quality report patient is due for: Vaccine and Immunizations, Colorectal cancer screening, PAP    Routed to provider for review and consideration. Adriana Lopez RN  ....................  7/25/2022   12:08 PM

## 2022-07-27 RX ORDER — TERCONAZOLE 8 MG/G
CREAM VAGINAL
Qty: 20 G | Refills: 1 | Status: SHIPPED | OUTPATIENT
Start: 2022-07-27 | End: 2023-07-20

## 2022-07-27 RX ORDER — CLOTRIMAZOLE AND BETAMETHASONE DIPROPIONATE 10; .64 MG/G; MG/G
CREAM TOPICAL
Qty: 45 G | Refills: 1 | Status: SHIPPED | OUTPATIENT
Start: 2022-07-27

## 2022-07-29 NOTE — TELEPHONE ENCOUNTER
Patient states she had colonoscopy at age 51,she will try to get the records sent to [facility].     Mery Tavera on 7/29/2022 at 12:37 PM

## 2022-08-11 ENCOUNTER — OFFICE VISIT (OUTPATIENT)
Dept: FAMILY MEDICINE | Facility: OTHER | Age: 57
End: 2022-08-11
Attending: NURSE PRACTITIONER
Payer: COMMERCIAL

## 2022-08-11 VITALS
DIASTOLIC BLOOD PRESSURE: 88 MMHG | BODY MASS INDEX: 35.97 KG/M2 | HEART RATE: 88 BPM | HEIGHT: 67 IN | RESPIRATION RATE: 18 BRPM | WEIGHT: 229.2 LBS | OXYGEN SATURATION: 98 % | TEMPERATURE: 98.3 F | SYSTOLIC BLOOD PRESSURE: 130 MMHG

## 2022-08-11 DIAGNOSIS — L98.9 SKIN LESION: Primary | ICD-10-CM

## 2022-08-11 DIAGNOSIS — N95.1 MENOPAUSAL SYNDROME (HOT FLASHES): ICD-10-CM

## 2022-08-11 PROCEDURE — G0463 HOSPITAL OUTPT CLINIC VISIT: HCPCS

## 2022-08-11 PROCEDURE — 99213 OFFICE O/P EST LOW 20 MIN: CPT | Performed by: NURSE PRACTITIONER

## 2022-08-11 ASSESSMENT — PAIN SCALES - GENERAL: PAINLEVEL: NO PAIN (0)

## 2022-08-11 NOTE — PROGRESS NOTES
Assessment & Plan   Problem List Items Addressed This Visit        Circulatory    Menopausal syndrome (hot flashes)      Other Visit Diagnoses     Skin lesion    -  Primary    Relevant Orders    Adult Dermatology Referral      We discussed ongoing use of Tabatha for menopause/menopause symptoms.  At this time she would like to continue this as she is tolerating it well.  She can discontinue this at any time if she desires.    Skin lesions, left shin is concerning.  We did discuss consult with general surgery for biopsy versus referral to dermatology.  At this time she would like to see a dermatologist but will follow-up locally if this is going to be booked out several months.      26 minutes spent on the date of the encounter doing chart review, history and exam, documentation and further activities per the note       No follow-ups on file.    MANUEL Ruiz [facility] \[facility] [place] [facility]\""    Franky Spencer is a 57 year old, presenting for the following health issues:  Derm Problem      History of Present Illness       Reason for visit:  For my moles to be checked out    She eats 2-3 servings of fruits and vegetables daily.She exercises with enough effort to increase her heart rate 9 or less minutes per day.  She exercises with enough effort to increase her heart rate 3 or less days per week.   She is taking medications regularly.     She comes in today for evaluation of a couple skin lesions.  She has a lesion on her left upper shin that has been there for quite some time, this is skin colored, raised but is painful to touch.  She also has a lesion under her left breast that she would like to be evaluated.  When she was living in [place] she would attend dermatology consult annually for skin checks.  She is wondering if she can get a referral for this again.    She is currently using Tabatha for menopause.  She states she is been on this for about 1 year.  She is not having any menopause  "symptoms at this time.  She is trying to lose weight.      Review of Systems   See above      Objective    /88   Pulse 88   Temp 98.3  F (36.8  C)   Resp 18   Ht 1.695 m (5' 6.75\")   Wt 104 kg (229 lb 3.2 oz)   SpO2 98%   BMI 36.17 kg/m    Body mass index is 36.17 kg/m .  Physical Exam   GENERAL: healthy, alert and no distress  SKIN: left upper shin with 0.4 cm irregular shaped lesion with rough texture, skin color. Under left breast, skin colored skin tag.   NEURO: Normal strength and tone, mentation intact and speech normal  PSYCH: mentation appears normal, affect normal/bright            .  ..  "

## 2022-08-11 NOTE — NURSING NOTE
Patient presents today for skin lesion.    Medication Reconciliation Complete    Audrey Tomlin LPN  8/11/2022 1:13 PM

## 2022-08-25 ENCOUNTER — DOCUMENTATION ONLY (OUTPATIENT)
Dept: OTHER | Facility: CLINIC | Age: 57
End: 2022-08-25

## 2022-09-13 ENCOUNTER — TRANSFERRED RECORDS (OUTPATIENT)
Dept: HEALTH INFORMATION MANAGEMENT | Facility: OTHER | Age: 57
End: 2022-09-13

## 2022-10-03 ENCOUNTER — TRANSFERRED RECORDS (OUTPATIENT)
Dept: HEALTH INFORMATION MANAGEMENT | Facility: OTHER | Age: 57
End: 2022-10-03

## 2022-10-03 ENCOUNTER — HEALTH MAINTENANCE LETTER (OUTPATIENT)
Age: 57
End: 2022-10-03

## 2022-10-10 ENCOUNTER — E-VISIT (OUTPATIENT)
Dept: URGENT CARE | Facility: CLINIC | Age: 57
End: 2022-10-10
Payer: COMMERCIAL

## 2022-10-10 DIAGNOSIS — J01.90 ACUTE BACTERIAL SINUSITIS: Primary | ICD-10-CM

## 2022-10-10 DIAGNOSIS — B96.89 ACUTE BACTERIAL SINUSITIS: Primary | ICD-10-CM

## 2022-10-10 PROCEDURE — 99421 OL DIG E/M SVC 5-10 MIN: CPT | Performed by: FAMILY MEDICINE

## 2022-10-10 NOTE — PATIENT INSTRUCTIONS
Dear Johanna Cm    After reviewing your responses, I've been able to diagnose you with?a sinus infection caused by bacteria.?     Based on your responses and diagnosis, I have prescribed AUgmentin to treat your symptoms. I have sent this to your pharmacy.?     It is also important to stay well hydrated, get lots of rest and take over-the-counter decongestants,?tylenol?or ibuprofen if you?are able to?take those medications per your primary care provider to help relieve discomfort.?     It is important that you take?all of?your prescribed medication even if your symptoms are improving after a few doses.? Taking?all of?your medicine helps prevent the symptoms from returning.?     If your symptoms worsen, you develop severe headache, vomiting, high fever (>102), or are not improving in 7 days, please contact your primary care provider for an appointment or visit any of our convenient Walk-in Care or Urgent Care Centers to be seen which can be found on our website?here.?     Thanks again for choosing?us?as your health care partner,?   ?  Louise Francis MD?

## 2022-11-21 ENCOUNTER — OFFICE VISIT (OUTPATIENT)
Dept: FAMILY MEDICINE | Facility: OTHER | Age: 57
End: 2022-11-21
Attending: PHYSICIAN ASSISTANT
Payer: COMMERCIAL

## 2022-11-21 ENCOUNTER — HOSPITAL ENCOUNTER (OUTPATIENT)
Dept: GENERAL RADIOLOGY | Facility: OTHER | Age: 57
Discharge: HOME | End: 2022-11-21
Attending: PHYSICIAN ASSISTANT
Payer: COMMERCIAL

## 2022-11-21 VITALS
DIASTOLIC BLOOD PRESSURE: 90 MMHG | HEART RATE: 82 BPM | TEMPERATURE: 97.9 F | WEIGHT: 226 LBS | RESPIRATION RATE: 18 BRPM | SYSTOLIC BLOOD PRESSURE: 122 MMHG | OXYGEN SATURATION: 98 % | BODY MASS INDEX: 35.66 KG/M2

## 2022-11-21 DIAGNOSIS — M77.12 LEFT TENNIS ELBOW: ICD-10-CM

## 2022-11-21 DIAGNOSIS — M25.522 LEFT ELBOW PAIN: Primary | ICD-10-CM

## 2022-11-21 DIAGNOSIS — M25.522 LEFT ELBOW PAIN: ICD-10-CM

## 2022-11-21 PROCEDURE — 73080 X-RAY EXAM OF ELBOW: CPT | Mod: LT

## 2022-11-21 PROCEDURE — G0463 HOSPITAL OUTPT CLINIC VISIT: HCPCS

## 2022-11-21 PROCEDURE — G0463 HOSPITAL OUTPT CLINIC VISIT: HCPCS | Mod: 25

## 2022-11-21 PROCEDURE — 99213 OFFICE O/P EST LOW 20 MIN: CPT | Performed by: PHYSICIAN ASSISTANT

## 2022-11-21 ASSESSMENT — ANXIETY QUESTIONNAIRES
GAD7 TOTAL SCORE: 0
3. WORRYING TOO MUCH ABOUT DIFFERENT THINGS: NOT AT ALL
2. NOT BEING ABLE TO STOP OR CONTROL WORRYING: NOT AT ALL
4. TROUBLE RELAXING: NOT AT ALL
7. FEELING AFRAID AS IF SOMETHING AWFUL MIGHT HAPPEN: NOT AT ALL
7. FEELING AFRAID AS IF SOMETHING AWFUL MIGHT HAPPEN: NOT AT ALL
GAD7 TOTAL SCORE: 0
6. BECOMING EASILY ANNOYED OR IRRITABLE: NOT AT ALL
5. BEING SO RESTLESS THAT IT IS HARD TO SIT STILL: NOT AT ALL
GAD7 TOTAL SCORE: 0
1. FEELING NERVOUS, ANXIOUS, OR ON EDGE: NOT AT ALL

## 2022-11-21 ASSESSMENT — PATIENT HEALTH QUESTIONNAIRE - PHQ9
SUM OF ALL RESPONSES TO PHQ QUESTIONS 1-9: 0
SUM OF ALL RESPONSES TO PHQ QUESTIONS 1-9: 0

## 2022-11-21 ASSESSMENT — PAIN SCALES - GENERAL: PAINLEVEL: MODERATE PAIN (5)

## 2022-11-21 NOTE — NURSING NOTE
Pt presents to clinic today for left arm pain for the last month for so. States she is not aware of injuring her arm, feels like a dull ache.   States she has been feeling lightheaded as well and feels better when she eats food, thinks it is her anxiety and stress..    FOOD SECURITY SCREENING QUESTIONS:    The next two questions are to help us understand your food security.  If you are feeling you need any assistance in this area, we have resources available to support you today.    Hunger Vital Signs:  Within the past 12 months we worried whether our food would run out before we got money to buy more. Never  Within the past 12 months the food we bought just didn't last and we didn't have money to get more. Never            Medication Reconciliation: complete  Trish Quiles LPN,LPN on 11/21/2022 at 10:41 AM

## 2022-11-21 NOTE — PROGRESS NOTES
"  Assessment & Plan   Problem List Items Addressed This Visit    None  Visit Diagnoses     Left elbow pain    -  Primary    Relevant Orders    XR Elbow Left G/E 3 Views (Completed)    Left tennis elbow        Relevant Orders    XR Elbow Left G/E 3 Views (Completed)         Completed left elbow xray.  I personally reviewed the xray. I found no fracture appreciated upon initial read of xray.  Final read pending by radiology.    Left elbow pain/tennis elbow:  Encouraged to take ibuprofen for relief up to 4 times per day.  Encouraged rest and elevation.  Encouraged to use ice or heat 15 minutes at a time several times per day to decrease pain. Return to clinic in 1-2 weeks as necessary for persistent pain. Return to clinic with any change or worsening of symptoms.   Encouraged use a tennis elbow band as needed.       BMI:   Estimated body mass index is 35.66 kg/m  as calculated from the following:    Height as of 8/11/22: 1.695 m (5' 6.75\").    Weight as of this encounter: 102.5 kg (226 lb).       See Patient Instructions    No follow-ups on file.    Lisa Hernández PA-C  [facility]    Franky Spencer is a 57 year old, presenting for the following health issues:  Arm Pain (Left arm)      Arm Pain    History of Present Illness       Reason for visit:  My arm hurts  Symptom onset:  More than a month  Symptoms include:  My left arm hurts  Symptom intensity:  Moderate  Symptom progression:  Staying the same  Had these symptoms before:  No  What makes it worse:  Lifting anything  What makes it better:  Ice    She eats 2-3 servings of fruits and vegetables daily.She consumes 0 sweetened beverage(s) daily.She exercises with enough effort to increase her heart rate 9 or less minutes per day.  She exercises with enough effort to increase her heart rate 3 or less days per week.   She is taking medications regularly.    Today's PHQ-9         PHQ-9 Total Score: 0    PHQ-9 Q9 Thoughts of better off " dead/self-harm past 2 weeks :   Not at all      Today's SHANNON-7 Score: 0         Pain History:  When did you first notice your pain? - Acute Pain   Have you seen anyone else for your pain? No  Where in your body do you have pain? left arm pain    Patient's been struggling with left elbow pain.  Increased pain with lifting.  Pain in the dorsal elbow.  Has a dull ache.  Symptoms have been flaring over the last month.  No recent fall or injury.  No bruising or swelling.  Ice has been helping with the pain.  Using Aleve as well which helps.  And will go to sleep at times.  Patient is right-handed.  Sleeps on that side.  Used her mouse with the computer in the past.  Has been retired over the last year.    Review of Systems   Constitutional, HEENT, cardiovascular, pulmonary, gi and gu systems are negative, except as otherwise noted.      Objective    BP (!) 122/90 (BP Location: Right arm, Patient Position: Sitting, Cuff Size: Adult Large)   Pulse 82   Temp 97.9  F (36.6  C) (Tympanic)   Resp 18   Wt 102.5 kg (226 lb)   SpO2 98%   BMI 35.66 kg/m    Body mass index is 35.66 kg/m .  Physical Exam  Vitals and nursing note reviewed.   Constitutional:       Appearance: Normal appearance.   HENT:      Head: Normocephalic and atraumatic.   Musculoskeletal:         General: No swelling, tenderness or signs of injury. Normal range of motion.      Cervical back: Normal range of motion.      Comments: Left dorsal elbow pain with palpation.  No ulnar groove pain with palpation.  No bruising or swelling appreciated.  Mild pain with supination and pronation against resistance.   Skin:     General: Skin is warm and dry.   Neurological:      General: No focal deficit present.      Mental Status: She is alert and oriented to person, place, and time.      Motor: No weakness.      Coordination: Coordination normal.      Gait: Gait normal.      Deep Tendon Reflexes: Reflexes normal.   Psychiatric:         Mood and Affect: Mood normal.          Behavior: Behavior normal.

## 2023-03-22 ENCOUNTER — E-VISIT (OUTPATIENT)
Dept: URGENT CARE | Facility: CLINIC | Age: 58
End: 2023-03-22
Payer: COMMERCIAL

## 2023-03-22 DIAGNOSIS — J01.90 ACUTE SINUSITIS WITH SYMPTOMS > 10 DAYS: Primary | ICD-10-CM

## 2023-03-22 PROCEDURE — 99207 PR NON-BILLABLE SERV PER CHARTING: CPT | Performed by: PHYSICIAN ASSISTANT

## 2023-03-22 NOTE — PATIENT INSTRUCTIONS
Sinusitis (Antibiotic Treatment)    The sinuses are air-filled spaces within the bones of the face. They connect to the inside of the nose. Sinusitis is an inflammation of the tissue that lines the sinuses. Sinusitis can occur during a cold. It can also happen due to allergies to pollens and other particles in the air. Sinusitis can cause symptoms of sinus congestion and a feeling of fullness. A sinus infection causes fever, headache, and facial pain. There is often green or yellow fluid draining from the nose or into the back of the throat (post-nasal drip). You have been given antibiotics to treat this condition.   Home care    Take the full course of antibiotics as instructed. Don't stop taking them, even when you feel better.    Drink plenty of water, hot tea, and other liquids as directed by the healthcare provider. This may help thin nasal mucus. It also may help your sinuses drain fluids.    Heat may help soothe painful areas of your face. Use a towel soaked in hot water. Or,  the shower and direct the warm spray onto your face. Using a vaporizer along with a menthol rub at night may also help soothe symptoms.     An expectorant with guaifenesin may help thin nasal mucus and help your sinuses drain fluids. Talk with your provider or pharmacists before taking an over-the-counter (OTC) medicine if you have any questions about it or its side effects..    You can use an OTC decongestant, unless a similar medicine was prescribed to you. Nasal sprays work the fastest. Use one that contains phenylephrine or oxymetazoline. First blow your nose gently. Then use the spray. Don't use these medicines more often than directed on the label. If you do, your symptoms may get worse. You may also take pills that contain pseudoephedrine. Don t use products that combine multiple medicines. This is because side effects may be increased. Read labels. You can also ask the pharmacist for help. (People with high blood  pressure should not use decongestants. They can raise blood pressure.) Talk with your provider or pharmacist if you have any questions about the medicine..    OTC antihistamines may help if allergies contributed to your sinusitis. Talk with your provider or pharmacist if you have any questions about the medicine.    Nasal rinses or irrigation may help symptoms. It's very important to use these products only as directed. Use sterile water or sterile saline solution and not tap water. Tap water may contain germs that can cause infection in the brain. Don't rinse with excess pressure. this may spread the infection to other areas in your sinuses or head. Ask your healthcare provider or pharmacist if you have questions about using these products.    Use acetaminophen, naproxen, or ibuprofen to control pain, unless another pain medicine was prescribed to you. Talk with your healthcare provider before using these medicines if you have chronic liver or kidney disease or ever had a stomach ulcer. Never give aspirin to anyone under age 18 without first talking to their healthcare provider. It may cause severe liver damage.    Don't smoke. This can make symptoms worse.    Follow-up care  Follow up with your healthcare provider as advised.   When to seek medical advice  Call your healthcare provider if any of these occur:     Facial pain or headache that gets worse    Symptoms don't go away in 10 days    Fever of 100.4 F (38 C) or higher, or as directed by your healthcare provider  Call 911  Call 911 if any of these occur:     Seizure    Trouble breathing    Feeling dizzy or faint    Fingernails, skin, or lips look blue, purple, or gray    Severe headache that doesn't go away    Stiff neck    Unusual drowsiness or confusion    Vision problems such as blurred or double vision    Swelling of your forehead or eyelids  Prevention  Here are steps you can take to help prevent an infection:     Keep good hand washing habits.    Don t  have close contact with people who have sore throats, colds, or other upper respiratory infections.    Don t smoke, and stay away from secondhand smoke.    Stay up to date with all of your vaccines.  [employer] last reviewed this educational content on 1/1/2022 2000-2022 [employer]. All rights reserved. This information is not intended as a substitute for professional medical care. Always follow your healthcare professional's instructions.        Dear Johanna Cm    After reviewing your responses, I've been able to diagnose you with Acute sinusitis with symptoms > 10 days.      Based on your responses and diagnosis, I have prescribed   Orders Placed This Encounter     amoxicillin-clavulanate (AUGMENTIN) 875-125 MG tablet    to treat your symptoms. I have sent this to your pharmacy.?     It is also important to stay well hydrated, get lots of rest and take over-the-counter decongestants,?tylenol?or ibuprofen if you?are able to?take those medications per your primary care provider to help relieve discomfort.?     It is important that you take?all of?your prescribed medication even if your symptoms are improving after a few doses.? Taking?all of?your medicine helps prevent the symptoms from returning.?     If your symptoms worsen, you develop severe headache, vomiting, high fever (>102), or are not improving in 7 days, please contact your primary care provider for an appointment or visit any of our convenient Walk-in Care or Urgent Care Centers to be seen which can be found on our website?here.?     Thanks again for choosing?us?as your health care partner,?   ?  Kole Velazquez PA-C?

## 2023-04-07 ENCOUNTER — TRANSFERRED RECORDS (OUTPATIENT)
Dept: HEALTH INFORMATION MANAGEMENT | Facility: OTHER | Age: 58
End: 2023-04-07
Payer: COMMERCIAL

## 2023-04-11 ENCOUNTER — OFFICE VISIT (OUTPATIENT)
Dept: FAMILY MEDICINE | Facility: OTHER | Age: 58
End: 2023-04-11
Attending: FAMILY MEDICINE
Payer: COMMERCIAL

## 2023-04-11 ENCOUNTER — HOSPITAL ENCOUNTER (OUTPATIENT)
Dept: GENERAL RADIOLOGY | Facility: OTHER | Age: 58
Discharge: HOME | End: 2023-04-11
Attending: FAMILY MEDICINE
Payer: COMMERCIAL

## 2023-04-11 VITALS
TEMPERATURE: 97.7 F | WEIGHT: 235 LBS | SYSTOLIC BLOOD PRESSURE: 144 MMHG | RESPIRATION RATE: 16 BRPM | OXYGEN SATURATION: 97 % | BODY MASS INDEX: 37.08 KG/M2 | HEART RATE: 82 BPM | DIASTOLIC BLOOD PRESSURE: 86 MMHG

## 2023-04-11 DIAGNOSIS — E66.09 CLASS 2 OBESITY DUE TO EXCESS CALORIES WITHOUT SERIOUS COMORBIDITY WITH BODY MASS INDEX (BMI) OF 37.0 TO 37.9 IN ADULT: ICD-10-CM

## 2023-04-11 DIAGNOSIS — M25.551 HIP PAIN, RIGHT: ICD-10-CM

## 2023-04-11 DIAGNOSIS — E66.812 CLASS 2 OBESITY DUE TO EXCESS CALORIES WITHOUT SERIOUS COMORBIDITY WITH BODY MASS INDEX (BMI) OF 37.0 TO 37.9 IN ADULT: ICD-10-CM

## 2023-04-11 DIAGNOSIS — M17.11 PRIMARY OSTEOARTHRITIS OF RIGHT KNEE: ICD-10-CM

## 2023-04-11 DIAGNOSIS — M25.551 HIP PAIN, RIGHT: Primary | ICD-10-CM

## 2023-04-11 PROCEDURE — 99213 OFFICE O/P EST LOW 20 MIN: CPT | Performed by: FAMILY MEDICINE

## 2023-04-11 PROCEDURE — 73562 X-RAY EXAM OF KNEE 3: CPT | Mod: RT

## 2023-04-11 PROCEDURE — 73502 X-RAY EXAM HIP UNI 2-3 VIEWS: CPT

## 2023-04-11 PROCEDURE — G0463 HOSPITAL OUTPT CLINIC VISIT: HCPCS

## 2023-04-11 PROCEDURE — G0463 HOSPITAL OUTPT CLINIC VISIT: HCPCS | Mod: 25

## 2023-04-11 ASSESSMENT — PAIN SCALES - GENERAL: PAINLEVEL: MILD PAIN (3)

## 2023-04-11 NOTE — NURSING NOTE
"Chief Complaint   Patient presents with     Hip Problem     Right      Patient is here for right hip pain.It also \"goes out.\" She has been going to the chiropractor but states she thinks she may be getting adjusted too much.     Initial BP (!) 134/92   Pulse 82   Temp 97.7  F (36.5  C) (Tympanic)   Resp 16   Wt 106.6 kg (235 lb)   SpO2 97%   Breastfeeding No   BMI 37.08 kg/m   Estimated body mass index is 37.08 kg/m  as calculated from the following:    Height as of 8/11/22: 1.695 m (5' 6.75\").    Weight as of this encounter: 106.6 kg (235 lb).  Medication Reconciliation: complete    Teodora Castorena CMA       FOOD SECURITY SCREENING QUESTIONS:    The next two questions are to help us understand your food security.  If you are feeling you need any assistance in this area, we have resources available to support you today.    Hunger Vital Signs:  Within the past 12 months we worried whether our food would run out before we got money to buy more. Never  Within the past 12 months the food we bought just didn't last and we didn't have money to get more. Never  Teodora Castorena CMA,LPN on 4/11/2023 at 1:29 PM      "

## 2023-04-11 NOTE — PROGRESS NOTES
"  Assessment & Plan       ICD-10-CM    1. Hip pain, right  M25.551 XR Hip Right 2-3 Views     XR Knee Right 3 Views      2. Primary osteoarthritis of right knee  M17.11 XR Knee Right 3 Views      3. Class 2 obesity due to excess calories without serious comorbidity with body mass index (BMI) of 37.0 to 37.9 in adult  E66.09     Z68.37         1.  I have personally reviewed the imaging test listed below.   2. Recommend that she see orthopedics for her knee and possible hip/bursal injection.  3.  Patient is correct in that wt management would likely help her symptoms.  WW has been helpful in the past.  Recommend she follow up with appointment to discuss this, labs, possible medications further.      Review of the result(s) of each unique test - xrays  Ordering of each unique test         BMI:   Estimated body mass index is 37.08 kg/m  as calculated from the following:    Height as of 8/11/22: 1.695 m (5' 6.75\").    Weight as of this encounter: 106.6 kg (235 lb).   Weight management plan: Specific weight management program called [facility] discussed        No follow-ups on file.    MARIAJOSE LARA MD  [facility]    Subjective   Johanna is a 57 year old, presenting for the following health issues:  Hip Problem (Right )    Onset of symptoms 6 months ago.  This is a painful feeling.  Symptoms will last until she goes in to have this treated.    When she would go to the chiropractor in the past - they could get in past in.    The other day she went in and was told her ribs and lower back were out too - Dr Camargo    Has had knee symptoms too.  Has left tennis elbow too.  [facility]        4/11/2023     1:25 PM   Additional Questions   Roomed by YULIANA Araiza   Accompanied by Self     History of Present Illness       Reason for visit:  My hip keeps going out for about 7 months now and i cant do much becuse of pain  Symptom onset:  More than a month  Symptoms include:  Hip " pain  Symptom intensity:  Moderate  Symptom progression:  Staying the same  Had these symptoms before:  No  What makes it worse:  When the hip is out  What makes it better:  Getting a ajustment and having thr hip put back in    She eats 2-3 servings of fruits and vegetables daily.She consumes 0 sweetened beverage(s) daily.She exercises with enough effort to increase her heart rate 9 or less minutes per day.  She exercises with enough effort to increase her heart rate 3 or less days per week.   She is taking medications regularly.     Current Outpatient Medications   Medication     acyclovir (ZOVIRAX) 400 MG tablet     clotrimazole-betamethasone (LOTRISONE) 1-0.05 % external cream     drospirenone-estradiol (ANGELIQ) 0.5-1 MG tablet     terconazole (TERAZOL 3) 0.8 % vaginal cream     No current facility-administered medications for this visit.           Review of Systems   No results found for: A1C  TSH   Date Value Ref Range Status   05/20/2022 1.21 0.40 - 4.00 mU/L Final     Last Comprehensive Metabolic Panel:  Lab Results   Component Value Date     05/20/2022    POTASSIUM 4.3 05/20/2022    CHLORIDE 106 05/20/2022    CO2 28 05/20/2022    ANIONGAP 6 05/20/2022    GLC 80 05/20/2022    BUN 12 05/20/2022    CR 0.74 05/20/2022    GFRESTIMATED >90 05/20/2022    SATNAM 9.6 05/20/2022             Objective    BP (!) 144/86   Pulse 82   Temp 97.7  F (36.5  C) (Tympanic)   Resp 16   Wt 106.6 kg (235 lb)   SpO2 97%   Breastfeeding No   BMI 37.08 kg/m    Body mass index is 37.08 kg/m .  Physical Exam  Vitals and nursing note reviewed.   Constitutional:       Appearance: Normal appearance. She is obese.   Musculoskeletal:      Comments: Tender right lateral hip; mildly tender midline lower back and SI joint  Bilateral knee crepitus    Neurological:      Mental Status: She is alert.            Results for orders placed or performed during the hospital encounter of 04/11/23   XR Knee Right 3 Views     Status: None     Narrative    PROCEDURE:  XR KNEE RIGHT 3 VIEWS    HISTORY: Hip pain, right; Primary osteoarthritis of right knee.    COMPARISON:  None.    TECHNIQUE:  3 views right knee.    FINDINGS:  No fracture or dislocation is identified. Bulky  tricompartmental osteophytosis is present with relatively preserved  joint spaces. No foreign body is seen.       Impression    IMPRESSION: Moderate tricompartmental osteoarthritis.      MICHELLE KUHN MD         SYSTEM ID:  [id number]   Results for orders placed or performed during the hospital encounter of 04/11/23   XR Hip Right 2-3 Views     Status: None    Narrative    PROCEDURE:  XR HIP RIGHT 2-3 VIEWS    HISTORY: Hip pain, right.    COMPARISON:  None.    TECHNIQUE:  AP pelvis and 2 views right hip.    FINDINGS:  No fracture or dislocation is identified. The hip joint  spaces are preserved. No foreign body is seen. Lumbar and SI joint  degenerative changes are seen.      Impression    IMPRESSION: No acute fracture.      MICHELLE KUHN MD         SYSTEM ID:  [id number]

## 2023-04-12 DIAGNOSIS — M17.11 PRIMARY OSTEOARTHRITIS OF RIGHT KNEE: Primary | ICD-10-CM

## 2023-04-24 ENCOUNTER — HOSPITAL ENCOUNTER (OUTPATIENT)
Dept: MAMMOGRAPHY | Facility: OTHER | Age: 58
Discharge: HOME | End: 2023-04-24
Attending: NURSE PRACTITIONER | Admitting: NURSE PRACTITIONER
Payer: COMMERCIAL

## 2023-04-24 DIAGNOSIS — Z12.31 VISIT FOR SCREENING MAMMOGRAM: ICD-10-CM

## 2023-04-24 PROCEDURE — 77067 SCR MAMMO BI INCL CAD: CPT

## 2023-05-15 ENCOUNTER — OFFICE VISIT (OUTPATIENT)
Dept: ORTHOPEDICS | Facility: OTHER | Age: 58
End: 2023-05-15
Attending: FAMILY MEDICINE
Payer: COMMERCIAL

## 2023-05-15 VITALS
HEART RATE: 71 BPM | DIASTOLIC BLOOD PRESSURE: 88 MMHG | OXYGEN SATURATION: 98 % | SYSTOLIC BLOOD PRESSURE: 142 MMHG | BODY MASS INDEX: 35.98 KG/M2 | WEIGHT: 228 LBS | TEMPERATURE: 96.8 F | RESPIRATION RATE: 16 BRPM

## 2023-05-15 DIAGNOSIS — M17.11 PRIMARY OSTEOARTHRITIS OF RIGHT KNEE: ICD-10-CM

## 2023-05-15 PROCEDURE — 99203 OFFICE O/P NEW LOW 30 MIN: CPT | Performed by: ORTHOPAEDIC SURGERY

## 2023-05-15 PROCEDURE — G0463 HOSPITAL OUTPT CLINIC VISIT: HCPCS

## 2023-05-15 NOTE — PROGRESS NOTES
Visit Date: 05/15/2023    CHIEF COMPLAINT:  A 57-year-old female with right knee pain.    HISTORY OF PRESENT ILLNESS:  Johanna Cm is a 57-year-old female with a history of surgery on bilateral knees in the past.  This is likely meniscectomies.  She has no real pain in her bilateral knees at this point, despite having known osteoarthritis.  She did have an issue recently where her right hip was bothering her.  She saw the chiropractor and then stopped seeing the chiropractor because the issue continued to get worse.  She actually improved after stopping seeing the chiropractor, but was told after the x-rays were done on her knee, that she probably needed to see an orthopedic surgeon.    PHYSICAL EXAMINATION:  On examination today, this is a 57-year-old female in no acute distress, very pleasant on examination.  She is moderately obese.  She has full range of motion of bilateral knees.  She is stable to varus and valgus stress.  Negative Lachman's, negative posterior drawer.  Minimal tender to palpation.  She has full range of motion of her hip as well and a relatively nonantalgic gait.    IMAGING:   X-ray examination of the hip is largely negative.  X-ray examination of bilateral knees shows tricompartmental osteoarthritis in bilateral knees.    IMPRESSION AND PLAN:  This is a 57-year-old female with end-stage osteoarthritis of bilateral knees.  She is tolerating this well and not having any real issues to the point where she does not even need nonsteroidal anti-inflammatories to manage it at this point.  We will see her back on an as-needed basis for this.  We did give her exercises to treat the greater trochanteric bursitis that she had in the right hip as well as discussed briefly lateral epicondylitis when it was mentioned.  As for the knees, I will see her back on an as-needed basis if she needs something done with her knees or they start giving her a significant amount more pain.    Cristiano Mishra,  MD        D: 05/15/2023   T: 05/15/2023   MT: STIVEN    Name:     DIMITRI MAI  MRN:      [id number]        Account:    [id number]   :      1965           Visit Date: 05/15/2023     Document: [id number]

## 2023-05-15 NOTE — PROGRESS NOTES
Patient is here for consult on her right knee pain.  Susan Mccray LPN .....................5/15/2023 10:18 AM

## 2023-05-24 DIAGNOSIS — B00.1 RECURRENT COLD SORES: ICD-10-CM

## 2023-05-25 NOTE — TELEPHONE ENCOUNTER
LAZARO sent Rx request for the following:    acyclovir 400 mg tablet  Last Prescription Date:   5/20/22  Last Fill Qty/Refills:         90, R-3    Last Office Visit:              4/11/23   Future Office visit:           None     Nicole Russell RN on 5/25/2023 at 4:16 PM

## 2023-05-26 RX ORDER — ACYCLOVIR 400 MG/1
TABLET ORAL
Qty: 90 TABLET | Refills: 3 | Status: SHIPPED | OUTPATIENT
Start: 2023-05-26 | End: 2024-02-12

## 2023-07-14 ENCOUNTER — OFFICE VISIT (OUTPATIENT)
Dept: FAMILY MEDICINE | Facility: OTHER | Age: 58
End: 2023-07-14
Attending: FAMILY MEDICINE
Payer: COMMERCIAL

## 2023-07-14 VITALS
TEMPERATURE: 97.3 F | OXYGEN SATURATION: 97 % | SYSTOLIC BLOOD PRESSURE: 128 MMHG | RESPIRATION RATE: 16 BRPM | DIASTOLIC BLOOD PRESSURE: 88 MMHG | WEIGHT: 227.2 LBS | HEIGHT: 67 IN | HEART RATE: 76 BPM | BODY MASS INDEX: 35.66 KG/M2

## 2023-07-14 DIAGNOSIS — J30.1 SEASONAL ALLERGIC RHINITIS DUE TO POLLEN: ICD-10-CM

## 2023-07-14 DIAGNOSIS — E66.812 CLASS 2 OBESITY DUE TO EXCESS CALORIES WITHOUT SERIOUS COMORBIDITY WITH BODY MASS INDEX (BMI) OF 37.0 TO 37.9 IN ADULT: Primary | ICD-10-CM

## 2023-07-14 DIAGNOSIS — E66.09 CLASS 2 OBESITY DUE TO EXCESS CALORIES WITHOUT SERIOUS COMORBIDITY WITH BODY MASS INDEX (BMI) OF 37.0 TO 37.9 IN ADULT: Primary | ICD-10-CM

## 2023-07-14 DIAGNOSIS — N95.1 MENOPAUSAL SYNDROME (HOT FLASHES): ICD-10-CM

## 2023-07-14 LAB
ALBUMIN SERPL BCG-MCNC: 4.4 G/DL (ref 3.5–5.2)
ALP SERPL-CCNC: 109 U/L (ref 35–104)
ALT SERPL W P-5'-P-CCNC: 17 U/L (ref 0–50)
ANION GAP SERPL CALCULATED.3IONS-SCNC: 11 MMOL/L (ref 7–15)
AST SERPL W P-5'-P-CCNC: 19 U/L (ref 0–45)
BILIRUB SERPL-MCNC: 0.3 MG/DL
BUN SERPL-MCNC: 14.4 MG/DL (ref 6–20)
CALCIUM SERPL-MCNC: 9.6 MG/DL (ref 8.6–10)
CHLORIDE SERPL-SCNC: 103 MMOL/L (ref 98–107)
CREAT SERPL-MCNC: 0.72 MG/DL (ref 0.51–0.95)
DEPRECATED HCO3 PLAS-SCNC: 26 MMOL/L (ref 22–29)
GFR SERPL CREATININE-BSD FRML MDRD: >90 ML/MIN/1.73M2
GLUCOSE SERPL-MCNC: 90 MG/DL (ref 70–99)
HBA1C MFR BLD: 5.1 % (ref 4–6.2)
HOLD SPECIMEN: NORMAL
POTASSIUM SERPL-SCNC: 4.4 MMOL/L (ref 3.4–5.3)
PROT SERPL-MCNC: 7.5 G/DL (ref 6.4–8.3)
SODIUM SERPL-SCNC: 140 MMOL/L (ref 136–145)
TSH SERPL DL<=0.005 MIU/L-ACNC: 1.26 UIU/ML (ref 0.3–4.2)

## 2023-07-14 PROCEDURE — 83036 HEMOGLOBIN GLYCOSYLATED A1C: CPT | Mod: ZL | Performed by: FAMILY MEDICINE

## 2023-07-14 PROCEDURE — G0463 HOSPITAL OUTPT CLINIC VISIT: HCPCS

## 2023-07-14 PROCEDURE — 80053 COMPREHEN METABOLIC PANEL: CPT | Mod: ZL | Performed by: FAMILY MEDICINE

## 2023-07-14 PROCEDURE — 99214 OFFICE O/P EST MOD 30 MIN: CPT | Performed by: FAMILY MEDICINE

## 2023-07-14 PROCEDURE — 84443 ASSAY THYROID STIM HORMONE: CPT | Mod: ZL | Performed by: FAMILY MEDICINE

## 2023-07-14 PROCEDURE — 36415 COLL VENOUS BLD VENIPUNCTURE: CPT | Mod: ZL | Performed by: FAMILY MEDICINE

## 2023-07-14 RX ORDER — FLUTICASONE PROPIONATE 50 MCG
1 SPRAY, SUSPENSION (ML) NASAL DAILY
Qty: 16 G | Refills: 11 | Status: SHIPPED | OUTPATIENT
Start: 2023-07-14 | End: 2023-10-09

## 2023-07-14 ASSESSMENT — ENCOUNTER SYMPTOMS
PALPITATIONS: 1
WHEEZING: 1

## 2023-07-14 ASSESSMENT — PAIN SCALES - GENERAL: PAINLEVEL: NO PAIN (0)

## 2023-07-14 NOTE — NURSING NOTE
"Chief Complaint   Patient presents with     Weight Problem     Allergies     RX for Flonase      Patient is here for weight concern. She would also like a prescription for Flonase nasal spray.     Initial /88   Pulse 76   Temp 97.3  F (36.3  C) (Tympanic)   Resp 16   Ht 1.695 m (5' 6.75\")   Wt 103.1 kg (227 lb 3.2 oz)   SpO2 97%   Breastfeeding No   BMI 35.85 kg/m   Estimated body mass index is 35.85 kg/m  as calculated from the following:    Height as of this encounter: 1.695 m (5' 6.75\").    Weight as of this encounter: 103.1 kg (227 lb 3.2 oz).  Medication Reconciliation: complete    Teodora Castorena CMA       FOOD SECURITY SCREENING QUESTIONS:    The next two questions are to help us understand your food security.  If you are feeling you need any assistance in this area, we have resources available to support you today.    Hunger Vital Signs:  Within the past 12 months we worried whether our food would run out before we got money to buy more. Never  Within the past 12 months the food we bought just didn't last and we didn't have money to get more. Never  Teodora Castorena CMA,LPN on 7/14/2023 at 9:19 AM      "

## 2023-07-14 NOTE — PATIENT INSTRUCTIONS
Goal wt 180#.    Stop hormones.      Labs today.        Possible treatment:  sleep apnea evaluation, medication (some of these are primarily for weight loss and some medications are used off label)    
No

## 2023-07-14 NOTE — PROGRESS NOTES
Assessment & Plan       ICD-10-CM    1. Class 2 obesity due to excess calories without serious comorbidity with body mass index (BMI) of 37.0 to 37.9 in adult  E66.09 TSH    Z68.37 Hemoglobin A1c     Comprehensive Metabolic Panel     TSH     Hemoglobin A1c     Comprehensive Metabolic Panel      2. Seasonal allergic rhinitis due to pollen  J30.1 fluticasone (FLONASE) 50 MCG/ACT nasal spray      3. Menopausal syndrome (hot flashes)  N95.1         1.  I have personally reviewed the labs listed below.  2.  For wt management, could consider dietician referral, medication.  She may benefit from starting metformin, vitamin D for diabetes prevention.  3.  Flonase refilled.     Ordering of each unique test  Prescription drug management           Return if symptoms worsen or fail to improve.    MARIAJOSE LARA MD  [facility]    Subjective   Johanna is a 58 year old, presenting for the following health issues:  Weight Problem and Allergies (RX for Flonase )        7/14/2023     9:15 AM   Additional Questions   Roomed by YULIANA Araiza   Accompanied by Self     Allergies    History of Present Illness       Reason for visit:  Talk about help with weight loss    She eats 4 or more servings of fruits and vegetables daily.She consumes 0 sweetened beverage(s) daily.She exercises with enough effort to increase her heart rate 10 to 19 minutes per day.  She exercises with enough effort to increase her heart rate 4 days per week.   She is taking medications regularly.       Johanna Cm is a 58 year old female in to discuss wt management  She has been doing wt watchers  - since 1999.  With increased activity, wt hasn't fluctuated.  History of HRT - this was due to premature menopause, since about age 43.    Currently no hot flashes or night sweats.      Goal would be to be at 180#.    When she was 51, she lost 50# with wt watchers      Sleep - she gets heartburn and snores, up every few hours at night  "     has diabetes - follows a carb controlled diet.  He has lost wt and she hasn't.      TSH   Date Value Ref Range Status   05/20/2022 1.21 0.40 - 4.00 mU/L Final         History of allergies and has used Flonase.  In her 30s, used to get immunotherapy.  Symptoms worse here than in [place].      Current Outpatient Medications   Medication     acyclovir (ZOVIRAX) 400 MG tablet     clotrimazole-betamethasone (LOTRISONE) 1-0.05 % external cream     drospirenone-estradiol (ANGELIQ) 0.5-1 MG tablet     fluticasone (FLONASE) 50 MCG/ACT nasal spray     terconazole (TERAZOL 3) 0.8 % vaginal cream     No current facility-administered medications for this visit.            Review of Systems   Respiratory: Positive for wheezing.    Cardiovascular: Positive for palpitations.        Episodic   Worse if takes medication that increases her HR overall    Musculoskeletal:        Bilateral knee pain/ osteoarthritis               Objective    /88   Pulse 76   Temp 97.3  F (36.3  C) (Tympanic)   Resp 16   Ht 1.695 m (5' 6.75\")   Wt 103.1 kg (227 lb 3.2 oz)   SpO2 97%   Breastfeeding No   BMI 35.85 kg/m    Body mass index is 35.85 kg/m .  Physical Exam  Vitals and nursing note reviewed.   Constitutional:       Appearance: Normal appearance.   Neurological:      Mental Status: She is alert.            Results for orders placed or performed in visit on 07/14/23   TSH     Status: Normal   Result Value Ref Range    TSH 1.26 0.30 - 4.20 uIU/mL   Hemoglobin A1c     Status: Normal   Result Value Ref Range    Hemoglobin A1C 5.1 4.0 - 6.2 %   Comprehensive Metabolic Panel     Status: Abnormal   Result Value Ref Range    Sodium 140 136 - 145 mmol/L    Potassium 4.4 3.4 - 5.3 mmol/L    Chloride 103 98 - 107 mmol/L    Carbon Dioxide (CO2) 26 22 - 29 mmol/L    Anion Gap 11 7 - 15 mmol/L    Urea Nitrogen 14.4 6.0 - 20.0 mg/dL    Creatinine 0.72 0.51 - 0.95 mg/dL    Calcium 9.6 8.6 - 10.0 mg/dL    Glucose 90 70 - 99 mg/dL    " Alkaline Phosphatase 109 (H) 35 - 104 U/L    AST 19 0 - 45 U/L    ALT 17 0 - 50 U/L    Protein Total 7.5 6.4 - 8.3 g/dL    Albumin 4.4 3.5 - 5.2 g/dL    Bilirubin Total 0.3 <=1.2 mg/dL    GFR Estimate >90 >60 mL/min/1.73m2   Extra Tube     Status: None    Narrative    The following orders were created for panel order Extra Tube.  Procedure                               Abnormality         Status                     ---------                               -----------         ------                     Extra Serum Separator Tu...[[id number]]                      Final result                 Please view results for these tests on the individual orders.   Extra Serum Separator Tube (SST)     Status: None   Result Value Ref Range    Hold Specimen [facility]

## 2023-07-17 DIAGNOSIS — N95.1 MENOPAUSAL SYNDROME (HOT FLASHES): ICD-10-CM

## 2023-07-17 DIAGNOSIS — E66.09 CLASS 2 OBESITY DUE TO EXCESS CALORIES WITHOUT SERIOUS COMORBIDITY WITH BODY MASS INDEX (BMI) OF 37.0 TO 37.9 IN ADULT: Primary | ICD-10-CM

## 2023-07-17 DIAGNOSIS — E66.812 CLASS 2 OBESITY DUE TO EXCESS CALORIES WITHOUT SERIOUS COMORBIDITY WITH BODY MASS INDEX (BMI) OF 37.0 TO 37.9 IN ADULT: Primary | ICD-10-CM

## 2023-07-17 RX ORDER — METFORMIN HCL 500 MG
500 TABLET, EXTENDED RELEASE 24 HR ORAL
Qty: 90 TABLET | Refills: 3 | Status: SHIPPED | OUTPATIENT
Start: 2023-07-17 | End: 2023-10-09

## 2023-08-13 ENCOUNTER — HEALTH MAINTENANCE LETTER (OUTPATIENT)
Age: 58
End: 2023-08-13

## 2023-10-09 ENCOUNTER — OFFICE VISIT (OUTPATIENT)
Dept: FAMILY MEDICINE | Facility: OTHER | Age: 58
End: 2023-10-09
Attending: FAMILY MEDICINE
Payer: COMMERCIAL

## 2023-10-09 VITALS
SYSTOLIC BLOOD PRESSURE: 130 MMHG | DIASTOLIC BLOOD PRESSURE: 86 MMHG | OXYGEN SATURATION: 98 % | HEART RATE: 74 BPM | RESPIRATION RATE: 14 BRPM | WEIGHT: 223.8 LBS | HEIGHT: 67 IN | BODY MASS INDEX: 35.12 KG/M2 | TEMPERATURE: 97.8 F

## 2023-10-09 DIAGNOSIS — H69.93 DYSFUNCTION OF BOTH EUSTACHIAN TUBES: ICD-10-CM

## 2023-10-09 DIAGNOSIS — J30.1 SEASONAL ALLERGIC RHINITIS DUE TO POLLEN: ICD-10-CM

## 2023-10-09 DIAGNOSIS — M17.11 PRIMARY OSTEOARTHRITIS OF RIGHT KNEE: ICD-10-CM

## 2023-10-09 DIAGNOSIS — E66.812 CLASS 2 OBESITY DUE TO EXCESS CALORIES WITHOUT SERIOUS COMORBIDITY WITH BODY MASS INDEX (BMI) OF 37.0 TO 37.9 IN ADULT: Primary | ICD-10-CM

## 2023-10-09 DIAGNOSIS — F41.9 ANXIETY-LIKE SYMPTOMS: ICD-10-CM

## 2023-10-09 DIAGNOSIS — E66.09 CLASS 2 OBESITY DUE TO EXCESS CALORIES WITHOUT SERIOUS COMORBIDITY WITH BODY MASS INDEX (BMI) OF 37.0 TO 37.9 IN ADULT: Primary | ICD-10-CM

## 2023-10-09 DIAGNOSIS — N95.1 MENOPAUSAL SYNDROME (HOT FLASHES): ICD-10-CM

## 2023-10-09 DIAGNOSIS — J01.01 ACUTE RECURRENT MAXILLARY SINUSITIS: ICD-10-CM

## 2023-10-09 PROCEDURE — 99214 OFFICE O/P EST MOD 30 MIN: CPT | Performed by: FAMILY MEDICINE

## 2023-10-09 PROCEDURE — G0463 HOSPITAL OUTPT CLINIC VISIT: HCPCS

## 2023-10-09 RX ORDER — METFORMIN HYDROCHLORIDE 750 MG/1
750 TABLET, EXTENDED RELEASE ORAL
Qty: 90 TABLET | Refills: 3 | Status: SHIPPED | OUTPATIENT
Start: 2023-10-09 | End: 2024-02-12

## 2023-10-09 RX ORDER — FLUTICASONE PROPIONATE 50 MCG
2 SPRAY, SUSPENSION (ML) NASAL DAILY
Qty: 16 G | Refills: 11 | Status: SHIPPED | OUTPATIENT
Start: 2023-10-09 | End: 2024-07-01

## 2023-10-09 ASSESSMENT — PAIN SCALES - GENERAL: PAINLEVEL: MODERATE PAIN (5)

## 2023-10-09 NOTE — PROGRESS NOTES
"    Assessment & Plan       ICD-10-CM    1. Class 2 obesity due to excess calories without serious comorbidity with body mass index (BMI) of 37.0 to 37.9 in adult  E66.09 metFORMIN (GLUCOPHAGE-XR) 750 MG 24 hr tablet    Z68.37       2. Primary osteoarthritis of right knee  M17.11       3. Menopausal syndrome (hot flashes)  N95.1       4. Seasonal allergic rhinitis due to pollen  J30.1 fluticasone (FLONASE) 50 MCG/ACT nasal spray     Adult ENT  Referral      5. Dysfunction of both eustachian tubes  H69.93 Adult ENT  Referral      6. Acute recurrent maxillary sinusitis  J01.01 Adult ENT  Referral      7. Anxiety-like symptoms  F41.9            Discussed success with wt loss over the past 6 months.  Will increase metformin to 750mg a day.  Follow up in 6 months  Discussed sleep management  Increase Flonase to 2 sprays each nostril daily.  Also discussed ENT referral due to persistent symptoms and history.   Discussed recent anxiety symptoms and strategies to mitigate those effects.     PDMP Review       None            Prescription drug management         BMI:   Estimated body mass index is 35.32 kg/m  as calculated from the following:    Height as of this encounter: 1.695 m (5' 6.75\").    Weight as of this encounter: 101.5 kg (223 lb 12.8 oz).   See above for plan         Return in about 6 months (around 4/9/2024).    MARIAJOSE LARA MD  [facility]    Subjective   Johanna Cm is a 58 year old female  presenting for the following health issues: Nursing Notes:   Megan Sheets LPN  10/9/2023 10:14 AM  Signed  Chief Complaint   Patient presents with    RECHECK     F/U for medications, weight loss       Initial /86 (BP Location: Right arm, Patient Position: Sitting, Cuff Size: Adult Regular)   Pulse 74   Temp 97.8  F (36.6  C) (Temporal)   Resp 14   Ht 1.695 m (5' 6.75\")   Wt 101.5 kg (223 lb 12.8 oz)   LMP 10/09/2021 (Approximate)   SpO2 98%   " "Breastfeeding No   BMI 35.32 kg/m   Estimated body mass index is 35.32 kg/m  as calculated from the following:    Height as of this encounter: 1.695 m (5' 6.75\").    Weight as of this encounter: 101.5 kg (223 lb 12.8 oz).    Medication Reconciliation: complete      Advance care plan reviewed      Megan Sheets LPN on 10/9/2023 at 10:13 AM                                 HPI Johanna Cm is a 58 year old female presents for wt management follow up.  She is on metforminXR 500mg.  She hasn't noticed much change with this.  Is active thru the day - doing remodeling at home.  Follows a [employer] meal plan.  Sleep - sleeps in 2 hour shifts.  Hot flashes are contributor to this.      Wt Readings from Last 4 Encounters:   10/09/23 101.5 kg (223 lb 12.8 oz)   07/14/23 103.1 kg (227 lb 3.2 oz)   05/15/23 103.4 kg (228 lb)   04/11/23 106.6 kg (235 lb)     Anxiety symptoms - came on with ripping siding off; beetles seemed to be swarming, holding her breath.  Was getting short of breath    Hands  - at night they will go to sleep, her whole hand and sometimes with being on her phone during the day;  needs to shake them out; her hands actually feel stronger with the work she is doing.      Bilateral ear pain - will get some dizziness with standing up, tries to pop them, has tried yawning, chewing gum, vicks; no hearing changes; this has been for a while; will have some intermittent congestion at night, takes zyrtec - used to have allergy shots before kids were born   Seemed back more with moving.  She           Current Outpatient Medications   Medication    acyclovir (ZOVIRAX) 400 MG tablet    Biotin 2500 MCG CAPS    clotrimazole-betamethasone (LOTRISONE) 1-0.05 % external cream    fluticasone (FLONASE) 50 MCG/ACT nasal spray    metFORMIN (GLUCOPHAGE-XR) 750 MG 24 hr tablet    Multiple Vitamins-Minerals (MULTIVITAMIN WOMEN 50+) TABS    Vitamin D3 (CHOLECALCIFEROL) 125 MCG (5000 UT) tablet     No current facility-administered " "medications for this visit.     Past Medical History:   Diagnosis Date    Obesity     Primary osteoarthritis of both knees                Review of Systems           11/21/2022    10:25 AM   PHQ   PHQ-9 Total Score 0   Q9: Thoughts of better off dead/self-harm past 2 weeks Not at all         11/21/2022    10:26 AM   SHANNON-7 SCORE   Total Score 0 (minimal anxiety)   Total Score 0             Objective  /86 (BP Location: Right arm, Patient Position: Sitting, Cuff Size: Adult Regular)   Pulse 74   Temp 97.8  F (36.6  C) (Temporal)   Resp 14   Ht 1.695 m (5' 6.75\")   Wt 101.5 kg (223 lb 12.8 oz)   LMP 10/09/2021 (Approximate)   SpO2 98%   Breastfeeding No   BMI 35.32 kg/m     Physical Exam   GENERAL: healthy, alert and no distress  HENT: TMs are normal bilaterally, she has moderately swollen lips venous mucosa, right more so than left.  Hyperpigmented            Answers submitted by the patient for this visit:  General Questionnaire (Submitted on 10/2/2023)  Chief Complaint: Chronic problems general questions HPI Form  What is the reason for your visit today? : Weight loss  How many servings of fruits and vegetables do you eat daily?: 2-3  On average, how many sweetened beverages do you drink each day (Examples: soda, juice, sweet tea, etc.  Do NOT count diet or artificially sweetened beverages)?: 0  How many minutes a day do you exercise enough to make your heart beat faster?: 30 to 60  How many days a week do you exercise enough to make your heart beat faster?: 5  How many days per week do you miss taking your medication?: 0    "

## 2023-10-09 NOTE — NURSING NOTE
"Chief Complaint   Patient presents with    RECHECK     F/U for medications, weight loss       Initial /86 (BP Location: Right arm, Patient Position: Sitting, Cuff Size: Adult Regular)   Pulse 74   Temp 97.8  F (36.6  C) (Temporal)   Resp 14   Ht 1.695 m (5' 6.75\")   Wt 101.5 kg (223 lb 12.8 oz)   LMP 10/09/2021 (Approximate)   SpO2 98%   Breastfeeding No   BMI 35.32 kg/m   Estimated body mass index is 35.32 kg/m  as calculated from the following:    Height as of this encounter: 1.695 m (5' 6.75\").    Weight as of this encounter: 101.5 kg (223 lb 12.8 oz).    Medication Reconciliation: complete      Advance care plan reviewed      Megan Sheets LPN on 10/9/2023 at 10:13 AM      "

## 2023-10-17 ENCOUNTER — TELEPHONE (OUTPATIENT)
Dept: OTOLARYNGOLOGY | Facility: OTHER | Age: 58
End: 2023-10-17
Payer: COMMERCIAL

## 2023-10-17 DIAGNOSIS — J01.01 ACUTE RECURRENT MAXILLARY SINUSITIS: Primary | ICD-10-CM

## 2023-10-17 NOTE — TELEPHONE ENCOUNTER
----- Message from Maggie Bell MD sent at 10/17/2023  2:18 PM CDT -----  Regarding: ct  Please see if we could get a CT sinus here or [place] before her consult Thursday thanks!

## 2023-10-17 NOTE — PROGRESS NOTES
Otolaryngology Consultation    Patient: Johanna Cm  : 1965    Patient presents with:  Allergic Rhinitis: Seasonal allergic rhinitis due to pollen dysfunction of both eustachian tubes acute recurrent.  Referral:  Dr. Jeremie Franklin    HPI:  Johanna Cm is a 58 year old female seen today for evaluation of chronic sinusitis and concerns for allergic rhinitis.    She has had years of chronic ear plugging, aural fullness, and described chronic sinusitis.    Sinusitis describes as facial pulsatile 8-10/10 pain  First symptom is bilateral maxillary pressure  No photophobia or phonophobia    Years of bruxism and preauricular tenderness with prior episodes of spontaneously described TMJ dislocation.      Facial pain and headaches improved with flonase    Former headaches at onset menstruation  No prior dx migraine    Associated rotatory vertigo with most episodes    Symptoms over 20 years    Congestion occurs during events      Formerly taking Flonase for years which was effective in improving congestion    Hx seasonal allergic rhinitis  Former subcutaneous immunotherapy in 30s for at least 3 years  She feels her allergies are very well controlled    SNOT 38 with a problem as bad as it can be with nasal blockage and ear fullness.  No or very mild symptoms with sneezing rhinorrhea cough no postnasal discharge no thick nasal discharge    ETDQ 31, mean 5.28 with a severe problem with pressure and pain in the ears a feeling that her ears are clogged and popping      No asthma  No asa allergy  Positive family hx allergies    No HL, no flux HL, no bothersome tinnitus  Otalgia with flying/hx barotrauma, takes afrin preflight    CT sinus dated 10/18/2023 shows clear sinuses throughout the lateral inferior turbinate hypertrophy with slight septal deviation to the left at the mid to superior septum  Middle ear clear mastoids well aerated.  No bone erosion.  Petrous carotid intact bilaterally    Accompanied by Dionicio  "      Current Outpatient Rx   Medication Sig Dispense Refill    acyclovir (ZOVIRAX) 400 MG tablet TAKE 1 TABLET BY MOUTH DAILY 90 tablet 3    Biotin 2500 MCG CAPS Take 1 capsule by mouth daily      clotrimazole-betamethasone (LOTRISONE) 1-0.05 % external cream apply TO affected area two TO three TIMES DAILY 45 g 1    fluticasone (FLONASE) 50 MCG/ACT nasal spray Spray 2 sprays into both nostrils daily 16 g 11    metFORMIN (GLUCOPHAGE-XR) 750 MG 24 hr tablet Take 1 tablet (750 mg) by mouth daily (with dinner) 90 tablet 3    Multiple Vitamins-Minerals (MULTIVITAMIN WOMEN 50+) TABS Take 1 tablet by mouth daily      Vitamin D3 (CHOLECALCIFEROL) 125 MCG (5000 UT) tablet Take 1 tablet by mouth daily         Allergies: Cats and Dust mites     Past Medical History:   Diagnosis Date    Obesity     Primary osteoarthritis of both knees        Past Surgical History:   Procedure Laterality Date    BILATERAL KNEE ARTHROSCOPY Bilateral     right knee arthropscopy Right        ENT family history reviewed    Social History     Tobacco Use    Smoking status: Never     Passive exposure: Never    Smokeless tobacco: Never   Vaping Use    Vaping Use: Never used   Substance Use Topics    Alcohol use: Yes     Comment: maybe 2 a week    Drug use: Never       Review of Systems  ROS: 10 point ROS neg other than the symptoms noted above in the HPI     Physical Exam  /76 (Cuff Size: Adult Large)   Pulse 78   Temp 97.8  F (36.6  C) (Tympanic)   Ht 1.695 m (5' 6.75\")   Wt 101.5 kg (223 lb 12.9 oz)   LMP 10/09/2021 (Approximate)   SpO2 98%   BMI 35.32 kg/m    General - The patient is well nourished and well developed, and appears to have good nutritional status.  Alert and oriented to person and place, answers questions and cooperates with examination appropriately.   Anxious during endoscopy and tearful during endoscopy  Head and Face - Normocephalic and atraumatic, with no gross asymmetry noted.  The facial nerve is intact, " with strong symmetric movements.  Voice and Breathing - The patient was breathing comfortably without the use of accessory muscles. There was no wheezing, stridor, or stertor.  The patients voice was clear and strong, and had appropriate pitch and quality.  No jose rafael peripheral digital clubbing or cyanosis   Ears -bilateral TMJ crepitus, click right  The external auditory canals are patent, the tympanic membranes are intact without effusion, retraction or mass.  Bony landmarks are intact.  Eyes - Extraocular movements intact, and the pupils were reactive to light.  Sclera were not icteric or injected, conjunctiva were pink and moist.  Mouth - Examination of the oral cavity showed pink, healthy oral mucosa. No lesions or ulcerations noted.  The tongue was mobile and midline, and the dentition were in good condition.    Throat - The walls of the oropharynx were smooth, pink, moist, symmetric, and had no lesions or ulcerations.  The tonsillar pillars and soft palate were symmetric.  The uvula was midline on elevation.  Grade 3 symmetric tonsils.  Garay Palate Position IIa  Neck - No palpable enlarged fixed cervical lymph nodes.  No neck cysts or unusual tenderness to palpation.   No palpable fixed thyroid nodules or concerning goiter.  The trachea is grossly midline.   Nose - External contour is symmetric, no gross deflection or scars.  Nasal mucosa is pink and moist with no abnormal mucus.  The septum and turbinates were evaluated.  No polyps, masses, or purulence noted on examination.    To evaluate the nose and sinuses, I performed rigid nasal endoscopy.  I applied topical nasal lidocaine and neosynephrine.    I began with the LEFT side using a 0 degree rigid nasal endoscope, and then similarly examined the RIGHT side    Findings:  Inferior turbinates:  3+ bilaterally  Middle turbinate and middle meatus:  No purulence, no polyposis,   Superior meatus was evaluated  Frontal recess clear  Mucosa is healthy  throughout,  no polyps nor polypoid degeneration  Sphenoethmoidal recess without purulence   Nasopharynx no mass, ET narrowed bilaterally with redundant tissue and mucosal edema, no mass  The patient tolerated the procedure well      Impression and Plan- Johanna Cm is a 58 year old female with:    ICD-10-CM    1. Ear fullness, bilateral  H93.8X3       2. Dysfunction of both eustachian tubes  H69.93 Adult ENT  Referral     predniSONE (DELTASONE) 20 MG tablet     budesonide (PULMICORT) 0.5 MG/2ML neb solution      3. TMJ (temporomandibular joint syndrome)  M26.609       4. Nasal turbinate hypertrophy  J34.3       5. Facial pain  R51.9       6. Migraine without aura and without status migrainosus, not intractable  G43.009       7. Perennial allergic rhinitis, treated post SCIT  J30.89             Reassured her sinuses are normal on CT and endoscopy  Working diagnosis is migraine without aura, TMJ causing facial pain, ETD    Audiogram, follow-up with Melvi or Yulia after  TMJ education, consider TMJ PT  Continue flonase daily  Prn budesonide    The risks of bilateral turbinate reduction, eustachian tube dilation, myringotomy surgery were discussed, including but not limited to general endotracheal anesthesia, bleeding, infection, synechiae, injury to the nose, possible repeat procedure or further surgery, possible hearing loss tympanic membrane perforation cholesteatoma need for future surgery and tube insertion, theoretic risk of injury to the petrous carotid with hemorrhage.  CT sinus shows unconcerning  ear anatomy.        Prednisone taper at onset of next episode facial pressure and/or severe TMJ flare.    Risks of oral steroid use were discussed and include psychiatric/mood changes, insomnia, stomach ulcers and potential GI bleeding, blood sugar elevation/worsening diabetes, hip/bone necrosis called avascular necrosis, or bone demineralization.          Use nasal saline as discussed today. Over the  counter Bradford med saline irrigation is recommended.  Try to use hypertonic saline which is 2 packages in 1 bradford med bottle per instructions on bottle.  Use this at least 2 times a day, blow your nose gently, then apply any other nasal medication that may have been prescribed today (nasal steroids or nasal antihistamines).  Take your antihistamine daily (cetirizine, loratadine, fexofenadine, or similar) or twice daily if recommended.  Stop your antihistamine 5 days before allergy testing.    Allergen avoidance measures were discussed and are important in preventing symptoms from occurring or worsening.    Follow up for allergy testing as recommended.  This may be a blood test (mRAST) or skin testing ( modified quantitative testing).    Indications for allergy testing include:   1) Confirm suspicion of allergic rhinitis due to inhalant allergies  2) Identify the offending allergen to determine specific mode of treatment  3) In the case of chronic rhinosinusitis: when symptoms are not controlled by avoidance and pharmacotherapy  4) In the Asthma patient when exacerbations may be due to perennial allergen exposure  5) Suspect food allergy  6) Otitis Media, chronic rhinitis, atopic dermatitis, Meniere disease, headache, pharyngitis or eye symptoms    Signed consent was obtained, and the risks of immunotherapy were discussed, including the potential for anaphylaxis.              Maggie Bell D.O.  Otolaryngology/Head and Neck Surgery  Allergy

## 2023-10-18 ENCOUNTER — HOSPITAL ENCOUNTER (OUTPATIENT)
Dept: CT IMAGING | Facility: OTHER | Age: 58
Discharge: HOME | End: 2023-10-18
Attending: OTOLARYNGOLOGY | Admitting: OTOLARYNGOLOGY
Payer: COMMERCIAL

## 2023-10-18 PROCEDURE — 70486 CT MAXILLOFACIAL W/O DYE: CPT

## 2023-10-19 ENCOUNTER — OFFICE VISIT (OUTPATIENT)
Dept: OTOLARYNGOLOGY | Facility: OTHER | Age: 58
End: 2023-10-19
Attending: OTOLARYNGOLOGY
Payer: COMMERCIAL

## 2023-10-19 VITALS
SYSTOLIC BLOOD PRESSURE: 128 MMHG | DIASTOLIC BLOOD PRESSURE: 76 MMHG | WEIGHT: 223.81 LBS | OXYGEN SATURATION: 98 % | BODY MASS INDEX: 35.13 KG/M2 | TEMPERATURE: 97.8 F | HEART RATE: 78 BPM | HEIGHT: 67 IN

## 2023-10-19 DIAGNOSIS — R51.9 FACIAL PAIN: ICD-10-CM

## 2023-10-19 DIAGNOSIS — M26.609 TMJ (TEMPOROMANDIBULAR JOINT SYNDROME): ICD-10-CM

## 2023-10-19 DIAGNOSIS — H93.8X3 EAR FULLNESS, BILATERAL: Primary | ICD-10-CM

## 2023-10-19 DIAGNOSIS — J30.89 PERENNIAL ALLERGIC RHINITIS: ICD-10-CM

## 2023-10-19 DIAGNOSIS — G43.009 MIGRAINE WITHOUT AURA AND WITHOUT STATUS MIGRAINOSUS, NOT INTRACTABLE: ICD-10-CM

## 2023-10-19 DIAGNOSIS — H69.93 DYSFUNCTION OF BOTH EUSTACHIAN TUBES: ICD-10-CM

## 2023-10-19 DIAGNOSIS — J34.3 NASAL TURBINATE HYPERTROPHY: ICD-10-CM

## 2023-10-19 PROCEDURE — 99204 OFFICE O/P NEW MOD 45 MIN: CPT | Mod: 25 | Performed by: OTOLARYNGOLOGY

## 2023-10-19 PROCEDURE — G0463 HOSPITAL OUTPT CLINIC VISIT: HCPCS | Mod: 25

## 2023-10-19 PROCEDURE — 31231 NASAL ENDOSCOPY DX: CPT | Performed by: OTOLARYNGOLOGY

## 2023-10-19 RX ORDER — PREDNISONE 20 MG/1
TABLET ORAL
Qty: 5 TABLET | Refills: 0 | Status: SHIPPED | OUTPATIENT
Start: 2023-10-19 | End: 2024-02-12

## 2023-10-19 RX ORDER — BUDESONIDE 0.5 MG/2ML
INHALANT ORAL
Qty: 200 ML | Refills: 11 | Status: SHIPPED | OUTPATIENT
Start: 2023-10-19 | End: 2024-08-16

## 2023-10-19 ASSESSMENT — PAIN SCALES - GENERAL: PAINLEVEL: MILD PAIN (3)

## 2023-10-19 NOTE — LETTER
10/19/2023         RE: Johanna Cm  [id number]  [facility] [address]        Dear Colleague,    Thank you for referring your patient, Johanna Cm, to the [facility]. Please see a copy of my visit note below.    Otolaryngology Consultation    Patient: Johanna Cm  : 1965    Patient presents with:  Allergic Rhinitis: Seasonal allergic rhinitis due to pollen dysfunction of both eustachian tubes acute recurrent.  Referral:  Dr. Jeremie Franklin    HPI:  Johanna Cm is a 58 year old female seen today for evaluation of chronic sinusitis and concerns for allergic rhinitis.    She has had years of chronic ear plugging, aural fullness, and described chronic sinusitis.    Sinusitis describes as facial pulsatile 8-10/10 pain  First symptom is bilateral maxillary pressure  No photophobia or phonophobia    Years of bruxism and preauricular tenderness with prior episodes of spontaneously described TMJ dislocation.      Facial pain and headaches improved with flonase    Former headaches at onset menstruation  No prior dx migraine    Associated rotatory vertigo with most episodes    Symptoms over 20 years    Congestion occurs during events      Formerly taking Flonase for years which was effective in improving congestion    Hx seasonal allergic rhinitis  Former subcutaneous immunotherapy in 30s for at least 3 years  She feels her allergies are very well controlled    SNOT 38 with a problem as bad as it can be with nasal blockage and ear fullness.  No or very mild symptoms with sneezing rhinorrhea cough no postnasal discharge no thick nasal discharge    ETDQ 31, mean 5.28 with a severe problem with pressure and pain in the ears a feeling that her ears are clogged and popping      No asthma  No asa allergy  Positive family hx allergies    No HL, no flux HL, no bothersome tinnitus  Otalgia with flying/hx barotrauma, takes afrin preflight    CT sinus dated 10/18/2023 shows clear sinuses  "throughout the lateral inferior turbinate hypertrophy with slight septal deviation to the left at the mid to superior septum  Middle ear clear mastoids well aerated.  No bone erosion.  Petrous carotid intact bilaterally    Accompanied by Dionicio,       Current Outpatient Rx   Medication Sig Dispense Refill     acyclovir (ZOVIRAX) 400 MG tablet TAKE 1 TABLET BY MOUTH DAILY 90 tablet 3     Biotin 2500 MCG CAPS Take 1 capsule by mouth daily       clotrimazole-betamethasone (LOTRISONE) 1-0.05 % external cream apply TO affected area two TO three TIMES DAILY 45 g 1     fluticasone (FLONASE) 50 MCG/ACT nasal spray Spray 2 sprays into both nostrils daily 16 g 11     metFORMIN (GLUCOPHAGE-XR) 750 MG 24 hr tablet Take 1 tablet (750 mg) by mouth daily (with dinner) 90 tablet 3     Multiple Vitamins-Minerals (MULTIVITAMIN WOMEN 50+) TABS Take 1 tablet by mouth daily       Vitamin D3 (CHOLECALCIFEROL) 125 MCG (5000 UT) tablet Take 1 tablet by mouth daily         Allergies: Cats and Dust mites     Past Medical History:   Diagnosis Date     Obesity      Primary osteoarthritis of both knees        Past Surgical History:   Procedure Laterality Date     BILATERAL KNEE ARTHROSCOPY Bilateral      right knee arthropscopy Right        ENT family history reviewed    Social History     Tobacco Use     Smoking status: Never     Passive exposure: Never     Smokeless tobacco: Never   Vaping Use     Vaping Use: Never used   Substance Use Topics     Alcohol use: Yes     Comment: maybe 2 a week     Drug use: Never       Review of Systems  ROS: 10 point ROS neg other than the symptoms noted above in the HPI     Physical Exam  /76 (Cuff Size: Adult Large)   Pulse 78   Temp 97.8  F (36.6  C) (Tympanic)   Ht 1.695 m (5' 6.75\")   Wt 101.5 kg (223 lb 12.9 oz)   LMP 10/09/2021 (Approximate)   SpO2 98%   BMI 35.32 kg/m    General - The patient is well nourished and well developed, and appears to have good nutritional status.  Alert and " oriented to person and place, answers questions and cooperates with examination appropriately.   Anxious during endoscopy and tearful during endoscopy  Head and Face - Normocephalic and atraumatic, with no gross asymmetry noted.  The facial nerve is intact, with strong symmetric movements.  Voice and Breathing - The patient was breathing comfortably without the use of accessory muscles. There was no wheezing, stridor, or stertor.  The patients voice was clear and strong, and had appropriate pitch and quality.  No jose rafael peripheral digital clubbing or cyanosis   Ears -bilateral TMJ crepitus, click right  The external auditory canals are patent, the tympanic membranes are intact without effusion, retraction or mass.  Bony landmarks are intact.  Eyes - Extraocular movements intact, and the pupils were reactive to light.  Sclera were not icteric or injected, conjunctiva were pink and moist.  Mouth - Examination of the oral cavity showed pink, healthy oral mucosa. No lesions or ulcerations noted.  The tongue was mobile and midline, and the dentition were in good condition.    Throat - The walls of the oropharynx were smooth, pink, moist, symmetric, and had no lesions or ulcerations.  The tonsillar pillars and soft palate were symmetric.  The uvula was midline on elevation.  Grade 3 symmetric tonsils.  Garay Palate Position IIa  Neck - No palpable enlarged fixed cervical lymph nodes.  No neck cysts or unusual tenderness to palpation.   No palpable fixed thyroid nodules or concerning goiter.  The trachea is grossly midline.   Nose - External contour is symmetric, no gross deflection or scars.  Nasal mucosa is pink and moist with no abnormal mucus.  The septum and turbinates were evaluated.  No polyps, masses, or purulence noted on examination.    To evaluate the nose and sinuses, I performed rigid nasal endoscopy.  I applied topical nasal lidocaine and neosynephrine.    I began with the LEFT side using a 0 degree rigid  nasal endoscope, and then similarly examined the RIGHT side    Findings:  Inferior turbinates:  3+ bilaterally  Middle turbinate and middle meatus:  No purulence, no polyposis,   Superior meatus was evaluated  Frontal recess clear  Mucosa is healthy throughout,  no polyps nor polypoid degeneration  Sphenoethmoidal recess without purulence   Nasopharynx no mass, ET narrowed bilaterally with redundant tissue and mucosal edema, no mass  The patient tolerated the procedure well      Impression and Plan- Johanna Cm is a 58 year old female with:    ICD-10-CM    1. Ear fullness, bilateral  H93.8X3       2. Dysfunction of both eustachian tubes  H69.93 Adult ENT  Referral     predniSONE (DELTASONE) 20 MG tablet     budesonide (PULMICORT) 0.5 MG/2ML neb solution      3. TMJ (temporomandibular joint syndrome)  M26.609       4. Nasal turbinate hypertrophy  J34.3       5. Facial pain  R51.9       6. Migraine without aura and without status migrainosus, not intractable  G43.009       7. Perennial allergic rhinitis, treated post SCIT  J30.89             Reassured her sinuses are normal on CT and endoscopy  Working diagnosis is migraine without aura, TMJ causing facial pain, ETD    Audiogram, follow-up with Melvi or Yulia after  TMJ education, consider TMJ PT  Continue flonase daily  Prn budesonide    The risks of bilateral turbinate reduction, eustachian tube dilation, myringotomy surgery were discussed, including but not limited to general endotracheal anesthesia, bleeding, infection, synechiae, injury to the nose, possible repeat procedure or further surgery, possible hearing loss tympanic membrane perforation cholesteatoma need for future surgery and tube insertion, theoretic risk of injury to the petrous carotid with hemorrhage.  CT sinus shows unconcerning  ear anatomy.        Prednisone taper at onset of next episode facial pressure and/or severe TMJ flare.    Risks of oral steroid use were discussed and include  psychiatric/mood changes, insomnia, stomach ulcers and potential GI bleeding, blood sugar elevation/worsening diabetes, hip/bone necrosis called avascular necrosis, or bone demineralization.          Use nasal saline as discussed today. Over the counter Bradford med saline irrigation is recommended.  Try to use hypertonic saline which is 2 packages in 1 bradford med bottle per instructions on bottle.  Use this at least 2 times a day, blow your nose gently, then apply any other nasal medication that may have been prescribed today (nasal steroids or nasal antihistamines).  Take your antihistamine daily (cetirizine, loratadine, fexofenadine, or similar) or twice daily if recommended.  Stop your antihistamine 5 days before allergy testing.    Allergen avoidance measures were discussed and are important in preventing symptoms from occurring or worsening.    Follow up for allergy testing as recommended.  This may be a blood test (mRAST) or skin testing ( modified quantitative testing).    Indications for allergy testing include:   1) Confirm suspicion of allergic rhinitis due to inhalant allergies  2) Identify the offending allergen to determine specific mode of treatment  3) In the case of chronic rhinosinusitis: when symptoms are not controlled by avoidance and pharmacotherapy  4) In the Asthma patient when exacerbations may be due to perennial allergen exposure  5) Suspect food allergy  6) Otitis Media, chronic rhinitis, atopic dermatitis, Meniere disease, headache, pharyngitis or eye symptoms    Signed consent was obtained, and the risks of immunotherapy were discussed, including the potential for anaphylaxis.              Maggie Bell D.O.  Otolaryngology/Head and Neck Surgery  Allergy              Again, thank you for allowing me to participate in the care of your patient.        Sincerely,        Maggie Bell MD

## 2023-10-19 NOTE — PATIENT INSTRUCTIONS
Thank you for allowing Dr. Bell and our ENT team to participate in your care.  If your medications are too expensive, please give the nurse a call.  We can possibly change this medication.  If you have a scheduling or an appointment question please contact our Health Unit Coordinator at their direct line [phone].   ALL nursing questions or concerns can be directed to your ENT nurse, Moshe, at: [phone]      Complete audiogram. Referral placed. You can schedule on the way out if completing here; otherwise, someone will call you to schedule.   Follow-up with Melvi or Yulia after audio  Follow-up to discuss Eustachian Tube Dilation, Myringotomy, Turbinate Reduction.     Budesonide instructions  Make the Ramsey Med Saline Solution using 2 packages of salt and previously boiled or distilled water.  This will make 240 ml of saline solution.  Mix the entire vial of budesonide into the solution.   Irrigate your nose 2 times a day with the warm budesonide solution using 1 bottle between nostrils in the morning, and one bottle at night.

## 2023-10-20 ENCOUNTER — MYC MEDICAL ADVICE (OUTPATIENT)
Dept: OTOLARYNGOLOGY | Facility: OTHER | Age: 58
End: 2023-10-20

## 2023-12-01 ENCOUNTER — TRANSFERRED RECORDS (OUTPATIENT)
Dept: HEALTH INFORMATION MANAGEMENT | Facility: CLINIC | Age: 58
End: 2023-12-01
Payer: COMMERCIAL

## 2024-01-09 ENCOUNTER — E-VISIT (OUTPATIENT)
Dept: URGENT CARE | Facility: CLINIC | Age: 59
End: 2024-01-09
Payer: COMMERCIAL

## 2024-01-09 DIAGNOSIS — J01.90 ACUTE BACTERIAL SINUSITIS: Primary | ICD-10-CM

## 2024-01-09 DIAGNOSIS — B96.89 ACUTE BACTERIAL SINUSITIS: Primary | ICD-10-CM

## 2024-01-09 PROCEDURE — 99421 OL DIG E/M SVC 5-10 MIN: CPT | Performed by: EMERGENCY MEDICINE

## 2024-02-12 ENCOUNTER — OFFICE VISIT (OUTPATIENT)
Dept: FAMILY MEDICINE | Facility: OTHER | Age: 59
End: 2024-02-12
Attending: PEDIATRICS
Payer: COMMERCIAL

## 2024-02-12 VITALS
WEIGHT: 226.2 LBS | BODY MASS INDEX: 35.5 KG/M2 | RESPIRATION RATE: 14 BRPM | HEIGHT: 67 IN | TEMPERATURE: 97.7 F | SYSTOLIC BLOOD PRESSURE: 134 MMHG | DIASTOLIC BLOOD PRESSURE: 84 MMHG | HEART RATE: 59 BPM

## 2024-02-12 DIAGNOSIS — E66.812 CLASS 2 OBESITY DUE TO EXCESS CALORIES WITHOUT SERIOUS COMORBIDITY WITH BODY MASS INDEX (BMI) OF 37.0 TO 37.9 IN ADULT: Primary | ICD-10-CM

## 2024-02-12 DIAGNOSIS — B00.1 RECURRENT COLD SORES: ICD-10-CM

## 2024-02-12 DIAGNOSIS — M17.11 PRIMARY OSTEOARTHRITIS OF RIGHT KNEE: ICD-10-CM

## 2024-02-12 DIAGNOSIS — E66.09 CLASS 2 OBESITY DUE TO EXCESS CALORIES WITHOUT SERIOUS COMORBIDITY WITH BODY MASS INDEX (BMI) OF 37.0 TO 37.9 IN ADULT: Primary | ICD-10-CM

## 2024-02-12 DIAGNOSIS — N95.1 MENOPAUSAL SYNDROME (HOT FLASHES): ICD-10-CM

## 2024-02-12 DIAGNOSIS — F41.9 ANXIETY-LIKE SYMPTOMS: ICD-10-CM

## 2024-02-12 PROCEDURE — 99214 OFFICE O/P EST MOD 30 MIN: CPT | Performed by: FAMILY MEDICINE

## 2024-02-12 PROCEDURE — G0463 HOSPITAL OUTPT CLINIC VISIT: HCPCS

## 2024-02-12 RX ORDER — ACYCLOVIR 400 MG/1
400 TABLET ORAL DAILY
Qty: 90 TABLET | Refills: 3 | Status: SHIPPED | OUTPATIENT
Start: 2024-02-12

## 2024-02-12 RX ORDER — METFORMIN HYDROCHLORIDE 750 MG/1
750 TABLET, EXTENDED RELEASE ORAL
Qty: 90 TABLET | Refills: 3 | Status: SHIPPED | OUTPATIENT
Start: 2024-02-12 | End: 2024-09-17

## 2024-02-12 ASSESSMENT — PAIN SCALES - GENERAL: PAINLEVEL: NO PAIN (0)

## 2024-02-12 NOTE — PATIENT INSTRUCTIONS
Benzphetamine  Contrave  Diethylpropion and diethylpropion ER  Lomaira  Phendimetrazine and phendimetrazine ER  Phentermine capsules (Apidex-P and generics):  15mg, 30mg, 37.5mg  Phentermine tablets (Apidex-P and generics):  37.5mg  Qsymia  Saxenda  Wegovy/ozempic/Zepbound     Xenical   - Glucophage   - Topamax   - Wellbutrin

## 2024-02-12 NOTE — PROGRESS NOTES
"    Assessment & Plan       ICD-10-CM    1. Class 2 obesity due to excess calories without serious comorbidity with body mass index (BMI) of 37.0 to 37.9 in adult  E66.09 metFORMIN (GLUCOPHAGE-XR) 750 MG 24 hr tablet    Z68.37 buPROPion (WELLBUTRIN XL) 150 MG 24 hr tablet     DISCONTINUED: Semaglutide-Weight Management (WEGOVY) 0.25 MG/0.5ML pen      2. Primary osteoarthritis of right knee  M17.11       3. Menopausal syndrome (hot flashes)  N95.1       4. Anxiety-like symptoms  F41.9       5. Recurrent cold sores  B00.1 acyclovir (ZOVIRAX) 400 MG tablet           Discussed stress and anxiety related to son's health.    Refill of acyclovir for cold sores   Increase metformin as GI system tolerates  Prescription for wegovy was sent but not covered by her insurance.  Changed to Wellbutrin as this may also help with mood symptoms.      Follow up in 6-12 weeks     PDMP Review       None            Prescription drug management  35 minutes spent by me on the date of the encounter doing chart review, patient visit, and documentation        BMI  Estimated body mass index is 35.69 kg/m  as calculated from the following:    Height as of this encounter: 1.695 m (5' 6.75\").    Weight as of this encounter: 102.6 kg (226 lb 3.2 oz).   Weight management plan: see above       No follow-ups on file.    MARIAJOSE LARA MD  [facility]    Subjective   Johanna Cm is a 58 year old female  presenting for the following health issues: Nursing Notes:   Megan Sheets LPN  2/12/2024 11:54 AM  Signed  Chief Complaint   Patient presents with    RECHECK     F/U - Weight loss medication, anxiety       Initial /84 (BP Location: Right arm, Patient Position: Sitting, Cuff Size: Adult Regular)   Pulse 59   Temp 97.7  F (36.5  C) (Temporal)   Resp 14   Ht 1.695 m (5' 6.75\")   Wt 102.6 kg (226 lb 3.2 oz)   LMP 10/09/2021 (Approximate)   Breastfeeding No   BMI 35.69 kg/m   Estimated body mass index is " "35.69 kg/m  as calculated from the following:    Height as of this encounter: 1.695 m (5' 6.75\").    Weight as of this encounter: 102.6 kg (226 lb 3.2 oz).    Medication Review: complete    The next two questions are to help us understand your food security.  If you are feeling you need any assistance in this area, we have resources available to support you today.          2/5/2024   SDOH- Food Insecurity   Within the past 12 months, did you worry that your food would run out before you got money to buy more? N   Within the past 12 months, did the food you bought just not last and you didn t have money to get more? N       Health Care Directive:  Patient has a Health Care Directive on file      Megan Sheets LPN                                 HPI Johanna Cm is a 58 year old female presents for wt management follow up.  Wt Readings from Last 4 Encounters:   02/12/24 102.6 kg (226 lb 3.2 oz)   10/19/23 101.5 kg (223 lb 12.9 oz)   10/09/23 101.5 kg (223 lb 12.8 oz)   07/14/23 103.1 kg (227 lb 3.2 oz)     She has found that she eats well, but if food triggers are there it is harder.  Taking Glucophage, denies side effects from this.    Lab Results   Component Value Date    A1C 5.1 07/14/2023     She's frustrated by her wt.  Knows areas that she can improve  Stress related to son being in their home, his recent changes with his health.          Current Outpatient Medications   Medication    acyclovir (ZOVIRAX) 400 MG tablet    Biotin 2500 MCG CAPS    budesonide (PULMICORT) 0.5 MG/2ML neb solution    buPROPion (WELLBUTRIN XL) 150 MG 24 hr tablet    clotrimazole-betamethasone (LOTRISONE) 1-0.05 % external cream    fluticasone (FLONASE) 50 MCG/ACT nasal spray    metFORMIN (GLUCOPHAGE-XR) 750 MG 24 hr tablet    Multiple Vitamins-Minerals (MULTIVITAMIN WOMEN 50+) TABS    Vitamin D3 (CHOLECALCIFEROL) 125 MCG (5000 UT) tablet     No current facility-administered medications for this visit.     Past Medical History: " "  Diagnosis Date    Obesity     Primary osteoarthritis of both knees              Review of Systems   Saw ENT in October.  December had hearing test.    Had a course of prednisone x5 days - this didn't help.      History of PACs - history of jitteriness with sudafed type medication         11/21/2022    10:25 AM   PHQ   PHQ-9 Total Score 0   Q9: Thoughts of better off dead/self-harm past 2 weeks Not at all         11/21/2022    10:26 AM   SHANNON-7 SCORE   Total Score 0 (minimal anxiety)   Total Score 0             Objective  /84 (BP Location: Right arm, Patient Position: Sitting, Cuff Size: Adult Regular)   Pulse 59   Temp 97.7  F (36.5  C) (Temporal)   Resp 14   Ht 1.695 m (5' 6.75\")   Wt 102.6 kg (226 lb 3.2 oz)   LMP 10/09/2021 (Approximate)   Breastfeeding No   BMI 35.69 kg/m     Physical Exam   GENERAL: alert and no distress              Answers submitted by the patient for this visit:  General Questionnaire (Submitted on 2/5/2024)  Chief Complaint: Chronic problems general questions HPI Form  What is the reason for your visit today? : Weight loss  How many servings of fruits and vegetables do you eat daily?: 2-3  On average, how many sweetened beverages do you drink each day (Examples: soda, juice, sweet tea, etc.  Do NOT count diet or artificially sweetened beverages)?: 0  How many minutes a day do you exercise enough to make your heart beat faster?: 20 to 29  How many days a week do you exercise enough to make your heart beat faster?: 4  How many days per week do you miss taking your medication?: 0    "

## 2024-02-12 NOTE — NURSING NOTE
"Chief Complaint   Patient presents with    RECHECK     F/U - Weight loss medication, anxiety       Initial /84 (BP Location: Right arm, Patient Position: Sitting, Cuff Size: Adult Regular)   Pulse 59   Temp 97.7  F (36.5  C) (Temporal)   Resp 14   Ht 1.695 m (5' 6.75\")   Wt 102.6 kg (226 lb 3.2 oz)   LMP 10/09/2021 (Approximate)   Breastfeeding No   BMI 35.69 kg/m   Estimated body mass index is 35.69 kg/m  as calculated from the following:    Height as of this encounter: 1.695 m (5' 6.75\").    Weight as of this encounter: 102.6 kg (226 lb 3.2 oz).    Medication Review: complete    The next two questions are to help us understand your food security.  If you are feeling you need any assistance in this area, we have resources available to support you today.          2/5/2024   SDOH- Food Insecurity   Within the past 12 months, did you worry that your food would run out before you got money to buy more? N   Within the past 12 months, did the food you bought just not last and you didn t have money to get more? N       Health Care Directive:  Patient has a Health Care Directive on file      Megan Sheets LPN      "

## 2024-02-13 ENCOUNTER — TELEPHONE (OUTPATIENT)
Dept: FAMILY MEDICINE | Facility: OTHER | Age: 59
End: 2024-02-13
Payer: COMMERCIAL

## 2024-02-14 RX ORDER — BUPROPION HYDROCHLORIDE 150 MG/1
150 TABLET ORAL EVERY MORNING
Qty: 90 TABLET | Refills: 0 | Status: SHIPPED | OUTPATIENT
Start: 2024-02-14 | End: 2024-03-29

## 2024-02-14 RX ORDER — PHENTERMINE HYDROCHLORIDE 15 MG/1
15 CAPSULE ORAL EVERY MORNING
Qty: 30 CAPSULE | Refills: 0 | Status: CANCELLED | OUTPATIENT
Start: 2024-02-14

## 2024-02-14 NOTE — TELEPHONE ENCOUNTER
Pt was prescribed Wegovy and per [employer] they must try Benzamphetamine or Phentermine first or have contradictions as to why they need to go straight to wegovy.    Pended phentermine prescription. Please advise.    Megan Sheets LPN on 2/14/2024 at 9:32 AM

## 2024-02-15 ENCOUNTER — MYC MEDICAL ADVICE (OUTPATIENT)
Dept: FAMILY MEDICINE | Facility: OTHER | Age: 59
End: 2024-02-15
Payer: COMMERCIAL

## 2024-02-15 DIAGNOSIS — K21.00 GASTROESOPHAGEAL REFLUX DISEASE WITH ESOPHAGITIS: Primary | ICD-10-CM

## 2024-02-15 DIAGNOSIS — K21.9 GASTROESOPHAGEAL REFLUX DISEASE WITHOUT ESOPHAGITIS: ICD-10-CM

## 2024-03-12 DIAGNOSIS — K21.9 GASTROESOPHAGEAL REFLUX DISEASE WITHOUT ESOPHAGITIS: ICD-10-CM

## 2024-03-13 NOTE — TELEPHONE ENCOUNTER
[facility] sent Rx request for the following:      Requested Prescriptions   Pending Prescriptions Disp Refills    omeprazole (PRILOSEC) 20 MG DR capsule [Pharmacy Med Name: omeprazole 20 mg capsule,delayed release] 30 capsule 0     Sig: Take 1 capsule (20 mg) by mouth daily     Last Prescription Date:   2/15/24  Last Fill Qty/Refills:         30, R-0    Last Office Visit:              2/12/24   Future Office visit:             Next 5 appointments (look out 90 days)      Mar 29, 2024 11:40 AM  (Arrive by 11:25 AM)  SHORT with Sera Franklin MD  [facility] ([facility] ) [address]  [place] [id number] [phone]          Per LOV note:  Follow up in 6-12 weeks      Prescription refilled per RN Medication Refill Policy.................... Teresa Vanegas RN ....................  3/13/2024   9:48 AM

## 2024-03-28 NOTE — PROGRESS NOTES
Assessment & Plan       ICD-10-CM    1. Anxiety-like symptoms  F41.9       2. Class 2 obesity due to excess calories without serious comorbidity with body mass index (BMI) of 37.0 to 37.9 in adult  E66.09 buPROPion (WELLBUTRIN XL) 300 MG 24 hr tablet    Z68.37       3. Squamous cell carcinoma of skin of face  C44.320       4. Gastroesophageal reflux disease without esophagitis  K21.9 famotidine (PEPCID) 20 MG tablet      5. Breast cancer screening by mammogram  Z12.31           Reviewed with patient her weight loss since her last visit, 4 pounds over 6 weeks.  Increase Wellbutrin to 300 mg a day.  Potential side effects with doing this change are discussed.  Continue to work on lifestyle factors.  She states Monday main things for her is accountability, so I did encourage her to send in her weight each week.  Another is alcohol intake/beer as she would sometimes choose beer over food for her food points.  Skin cancer history is updated  After current dose/prescription of omeprazole, will have her switch to famotidine 20 mg a day.  She is aware that for her weight in particular plays a role into her degree of heartburn.  Also consider having her get an endoscopy at the time of her next colonoscopy.  Mammogram scheduled next month  Next Pap smear 2026    PDMP Review       None            Prescription drug management  40 minutes spent by me on the date of the encounter doing chart review, patient visit, and documentation          Return in about 3 months (around 6/29/2024).    MARIAJOSE LARA MD  [facility]    Subjective   Johanna Cm is a 58 year old female  presenting for the following health issues: Nursing Notes:   Megan Sheets LPN  3/29/2024 11:59 AM  Signed  Chief Complaint   Patient presents with    RECHECK     F/U on weight loss,  medication - metformin,wellbutrin  Anxiety, mole removal       Initial /80 (BP Location: Right arm, Patient Position: Sitting, Cuff  "Size: Adult Regular)   Pulse 71   Temp 98.3  F (36.8  C) (Tympanic)   Resp 14   Ht 1.695 m (5' 6.75\")   Wt 100.7 kg (222 lb)   LMP 10/09/2021 (Approximate)   SpO2 98%   Breastfeeding No   BMI 35.03 kg/m   Estimated body mass index is 35.03 kg/m  as calculated from the following:    Height as of this encounter: 1.695 m (5' 6.75\").    Weight as of this encounter: 100.7 kg (222 lb).    Medication Review: complete    The next two questions are to help us understand your food security.  If you are feeling you need any assistance in this area, we have resources available to support you today.          2/5/2024   SDOH- Food Insecurity   Within the past 12 months, did you worry that your food would run out before you got money to buy more? N   Within the past 12 months, did the food you bought just not last and you didn t have money to get more? N     Health Care Directive:  Patient has a Health Care Directive on file      Megan Sheets LPN                                 HPI Johanna Cm is a 58 year old female presents for follow up of wt loss management.    At her last visit, metformin was increased and Wellbutrin started.  The Wellbutrin was for both weight management as well as anxiety related symptoms.  At first, the Wellbutrin made her not feel good, then a little nausea.    She is also taking Glucophage.  At her last visit, a prescription for semaglutide has been provided, is not covered by her insurance.  She states that she is a lifelong member of weight watchers.  But she had appreciated from weight watchers in the past was the weigh-in's/meetings and accountability.  Sleep is better.    Her will power is better.      Heartburn - much better now ; hasn't needed to take any additional Tums since starting on omeprazole.  She wonders about how long she should be taking this medication.        Answers submitted by the patient for this visit:  General Questionnaire (Submitted on 3/22/2024)  Chief " Complaint: Chronic problems general questions HPI Form  What is the reason for your visit today? : Working with Dr on weight loss  How many servings of fruits and vegetables do you eat daily?: 4 or more  On average, how many sweetened beverages do you drink each day (Examples: soda, juice, sweet tea, etc.  Do NOT count diet or artificially sweetened beverages)?: 0  How many minutes a day do you exercise enough to make your heart beat faster?: 9 or less  How many days a week do you exercise enough to make your heart beat faster?: 3 or less  How many days per week do you miss taking your medication?: 0      Wt Readings from Last 4 Encounters:   03/29/24 100.7 kg (222 lb)   02/12/24 102.6 kg (226 lb 3.2 oz)   10/19/23 101.5 kg (223 lb 12.9 oz)   10/09/23 101.5 kg (223 lb 12.8 oz)         Current Outpatient Medications   Medication    acyclovir (ZOVIRAX) 400 MG tablet    Biotin 2500 MCG CAPS    budesonide (PULMICORT) 0.5 MG/2ML neb solution    buPROPion (WELLBUTRIN XL) 300 MG 24 hr tablet    clotrimazole-betamethasone (LOTRISONE) 1-0.05 % external cream    famotidine (PEPCID) 20 MG tablet    fluticasone (FLONASE) 50 MCG/ACT nasal spray    metFORMIN (GLUCOPHAGE-XR) 750 MG 24 hr tablet    Multiple Vitamins-Minerals (MULTIVITAMIN WOMEN 50+) TABS    omeprazole (PRILOSEC) 20 MG DR capsule    Vitamin D3 (CHOLECALCIFEROL) 125 MCG (5000 UT) tablet     No current facility-administered medications for this visit.     Past Medical History:   Diagnosis Date    History of skin cancer 03/2024    SCC calf    Obesity     Primary osteoarthritis of both knees                Review of Systems     Patient does show me pictures and presents a pathology report from her skin cancer procedure.  She does have follow-up next month.    She has a mammogram scheduled next month.    She has questions regarding cervical cancer screening.  States she has always had normal Pap smears.  Last Pap smear was 2021.            11/21/2022    10:25 AM   PHQ  "  PHQ-9 Total Score 0   Q9: Thoughts of better off dead/self-harm past 2 weeks Not at all         11/21/2022    10:26 AM   SHANNON-7 SCORE   Total Score 0 (minimal anxiety)   Total Score 0             Objective  /80 (BP Location: Right arm, Patient Position: Sitting, Cuff Size: Adult Regular)   Pulse 71   Temp 98.3  F (36.8  C) (Tympanic)   Resp 14   Ht 1.695 m (5' 6.75\")   Wt 100.7 kg (222 lb)   LMP 10/09/2021 (Approximate)   SpO2 98%   Breastfeeding No   BMI 35.03 kg/m     Physical Exam   GENERAL: alert and no distress    Office Visit on 07/14/2023   Component Date Value Ref Range Status    TSH 07/14/2023 1.26  0.30 - 4.20 uIU/mL Final    Hemoglobin A1C 07/14/2023 5.1  4.0 - 6.2 % Final    Sodium 07/14/2023 140  136 - 145 mmol/L Final    Potassium 07/14/2023 4.4  3.4 - 5.3 mmol/L Final    Chloride 07/14/2023 103  98 - 107 mmol/L Final    Carbon Dioxide (CO2) 07/14/2023 26  22 - 29 mmol/L Final    Anion Gap 07/14/2023 11  7 - 15 mmol/L Final    Urea Nitrogen 07/14/2023 14.4  6.0 - 20.0 mg/dL Final    Creatinine 07/14/2023 0.72  0.51 - 0.95 mg/dL Final    Calcium 07/14/2023 9.6  8.6 - 10.0 mg/dL Final    Glucose 07/14/2023 90  70 - 99 mg/dL Final    Alkaline Phosphatase 07/14/2023 109 (H)  35 - 104 U/L Final    AST 07/14/2023 19  0 - 45 U/L Final    Reference intervals for this test were updated on 6/12/2023 to more accurately reflect our healthy population. There may be differences in the flagging of prior results with similar values performed with this method. Interpretation of those prior results can be made in the context of the updated reference intervals.    ALT 07/14/2023 17  0 - 50 U/L Final    Reference intervals for this test were updated on 6/12/2023 to more accurately reflect our healthy population. There may be differences in the flagging of prior results with similar values performed with this method. Interpretation of those prior results can be made in the context of the updated reference " intervals.      Protein Total 07/14/2023 7.5  6.4 - 8.3 g/dL Final    Albumin 07/14/2023 4.4  3.5 - 5.2 g/dL Final    Bilirubin Total 07/14/2023 0.3  <=1.2 mg/dL Final    GFR Estimate 07/14/2023 >90  >60 mL/min/1.73m2 Final    Hold Specimen 07/14/2023 [facility]   Final

## 2024-03-29 ENCOUNTER — OFFICE VISIT (OUTPATIENT)
Dept: FAMILY MEDICINE | Facility: OTHER | Age: 59
End: 2024-03-29
Attending: FAMILY MEDICINE
Payer: COMMERCIAL

## 2024-03-29 VITALS
RESPIRATION RATE: 14 BRPM | HEIGHT: 67 IN | HEART RATE: 71 BPM | OXYGEN SATURATION: 98 % | BODY MASS INDEX: 34.84 KG/M2 | WEIGHT: 222 LBS | DIASTOLIC BLOOD PRESSURE: 80 MMHG | SYSTOLIC BLOOD PRESSURE: 130 MMHG | TEMPERATURE: 98.3 F

## 2024-03-29 DIAGNOSIS — K21.9 GASTROESOPHAGEAL REFLUX DISEASE WITHOUT ESOPHAGITIS: ICD-10-CM

## 2024-03-29 DIAGNOSIS — F41.9 ANXIETY-LIKE SYMPTOMS: Primary | ICD-10-CM

## 2024-03-29 DIAGNOSIS — C44.320 SQUAMOUS CELL CARCINOMA OF SKIN OF FACE: ICD-10-CM

## 2024-03-29 DIAGNOSIS — E66.09 CLASS 2 OBESITY DUE TO EXCESS CALORIES WITHOUT SERIOUS COMORBIDITY WITH BODY MASS INDEX (BMI) OF 37.0 TO 37.9 IN ADULT: ICD-10-CM

## 2024-03-29 DIAGNOSIS — Z12.31 BREAST CANCER SCREENING BY MAMMOGRAM: ICD-10-CM

## 2024-03-29 DIAGNOSIS — E66.812 CLASS 2 OBESITY DUE TO EXCESS CALORIES WITHOUT SERIOUS COMORBIDITY WITH BODY MASS INDEX (BMI) OF 37.0 TO 37.9 IN ADULT: ICD-10-CM

## 2024-03-29 PROCEDURE — 99215 OFFICE O/P EST HI 40 MIN: CPT | Performed by: FAMILY MEDICINE

## 2024-03-29 PROCEDURE — G0463 HOSPITAL OUTPT CLINIC VISIT: HCPCS

## 2024-03-29 RX ORDER — FAMOTIDINE 20 MG/1
20 TABLET, FILM COATED ORAL DAILY
Qty: 90 TABLET | Refills: 3 | Status: SHIPPED | OUTPATIENT
Start: 2024-03-29 | End: 2024-07-01

## 2024-03-29 RX ORDER — BUPROPION HYDROCHLORIDE 300 MG/1
300 TABLET ORAL EVERY MORNING
Qty: 90 TABLET | Refills: 3 | Status: SHIPPED | OUTPATIENT
Start: 2024-03-29

## 2024-03-29 ASSESSMENT — PAIN SCALES - GENERAL: PAINLEVEL: NO PAIN (0)

## 2024-03-29 NOTE — NURSING NOTE
"Chief Complaint   Patient presents with    RECHECK     F/U on weight loss,  medication - metformin,wellbutrin  Anxiety, mole removal       Initial /80 (BP Location: Right arm, Patient Position: Sitting, Cuff Size: Adult Regular)   Pulse 71   Temp 98.3  F (36.8  C) (Tympanic)   Resp 14   Ht 1.695 m (5' 6.75\")   Wt 100.7 kg (222 lb)   LMP 10/09/2021 (Approximate)   SpO2 98%   Breastfeeding No   BMI 35.03 kg/m   Estimated body mass index is 35.03 kg/m  as calculated from the following:    Height as of this encounter: 1.695 m (5' 6.75\").    Weight as of this encounter: 100.7 kg (222 lb).    Medication Review: complete    The next two questions are to help us understand your food security.  If you are feeling you need any assistance in this area, we have resources available to support you today.          2/5/2024   SDOH- Food Insecurity   Within the past 12 months, did you worry that your food would run out before you got money to buy more? N   Within the past 12 months, did the food you bought just not last and you didn t have money to get more? N     Health Care Directive:  Patient has a Health Care Directive on file      Megan Sheets LPN      "

## 2024-04-12 ENCOUNTER — MYC MEDICAL ADVICE (OUTPATIENT)
Dept: FAMILY MEDICINE | Facility: OTHER | Age: 59
End: 2024-04-12
Payer: COMMERCIAL

## 2024-04-12 NOTE — TELEPHONE ENCOUNTER
Wt Readings from Last 5 Encounters:   03/29/24 100.7 kg (222 lb)   02/12/24 102.6 kg (226 lb 3.2 oz)   10/19/23 101.5 kg (223 lb 12.9 oz)   10/09/23 101.5 kg (223 lb 12.8 oz)   07/14/23 103.1 kg (227 lb 3.2 oz)     Current Outpatient Medications   Medication Sig Dispense Refill    acyclovir (ZOVIRAX) 400 MG tablet Take 1 tablet (400 mg) by mouth daily 90 tablet 3    Biotin 2500 MCG CAPS Take 1 capsule by mouth daily      budesonide (PULMICORT) 0.5 MG/2ML neb solution Squirt entire vial into [facility] med saline solution, mix, and irrigate each nostril until entire bottle empty.  Do this twice daily. 200 mL 11    buPROPion (WELLBUTRIN XL) 300 MG 24 hr tablet Take 1 tablet (300 mg) by mouth every morning 90 tablet 3    clotrimazole-betamethasone (LOTRISONE) 1-0.05 % external cream apply TO affected area two TO three TIMES DAILY 45 g 1    famotidine (PEPCID) 20 MG tablet Take 1 tablet (20 mg) by mouth daily 90 tablet 3    fluticasone (FLONASE) 50 MCG/ACT nasal spray Spray 2 sprays into both nostrils daily 16 g 11    metFORMIN (GLUCOPHAGE-XR) 750 MG 24 hr tablet Take 1 tablet (750 mg) by mouth daily (with dinner) 90 tablet 3    Multiple Vitamins-Minerals (MULTIVITAMIN WOMEN 50+) TABS Take 1 tablet by mouth daily      omeprazole (PRILOSEC) 20 MG DR capsule Take 1 capsule (20 mg) by mouth daily 30 capsule 0    Vitamin D3 (CHOLECALCIFEROL) 125 MCG (5000 UT) tablet Take 1 tablet by mouth daily       No current facility-administered medications for this visit.

## 2024-04-23 ENCOUNTER — HOSPITAL ENCOUNTER (OUTPATIENT)
Dept: GENERAL RADIOLOGY | Facility: OTHER | Age: 59
Discharge: HOME | End: 2024-04-23
Attending: FAMILY MEDICINE
Payer: COMMERCIAL

## 2024-04-23 ENCOUNTER — OFFICE VISIT (OUTPATIENT)
Dept: FAMILY MEDICINE | Facility: OTHER | Age: 59
End: 2024-04-23
Attending: FAMILY MEDICINE
Payer: COMMERCIAL

## 2024-04-23 VITALS
WEIGHT: 223.2 LBS | SYSTOLIC BLOOD PRESSURE: 124 MMHG | TEMPERATURE: 97 F | OXYGEN SATURATION: 97 % | RESPIRATION RATE: 16 BRPM | HEART RATE: 82 BPM | BODY MASS INDEX: 35.22 KG/M2 | DIASTOLIC BLOOD PRESSURE: 82 MMHG

## 2024-04-23 DIAGNOSIS — R20.2 PARESTHESIA OF BOTH HANDS: ICD-10-CM

## 2024-04-23 DIAGNOSIS — M79.642 PAIN OF LEFT HAND: ICD-10-CM

## 2024-04-23 DIAGNOSIS — M25.532 PAIN IN LEFT WRIST: ICD-10-CM

## 2024-04-23 DIAGNOSIS — M25.531 RIGHT WRIST PAIN: ICD-10-CM

## 2024-04-23 DIAGNOSIS — M79.641 PAIN OF RIGHT HAND: ICD-10-CM

## 2024-04-23 DIAGNOSIS — M79.641 PAIN OF RIGHT HAND: Primary | ICD-10-CM

## 2024-04-23 DIAGNOSIS — Z23 NEED FOR TDAP VACCINATION: ICD-10-CM

## 2024-04-23 LAB
CRP SERPL-MCNC: 8.84 MG/L
ERYTHROCYTE [SEDIMENTATION RATE] IN BLOOD BY WESTERGREN METHOD: 9 MM/HR (ref 0–30)
URATE SERPL-MCNC: 3.4 MG/DL (ref 2.4–5.7)

## 2024-04-23 PROCEDURE — 85652 RBC SED RATE AUTOMATED: CPT | Mod: ZL | Performed by: FAMILY MEDICINE

## 2024-04-23 PROCEDURE — 99213 OFFICE O/P EST LOW 20 MIN: CPT | Performed by: FAMILY MEDICINE

## 2024-04-23 PROCEDURE — 86038 ANTINUCLEAR ANTIBODIES: CPT | Mod: ZL | Performed by: FAMILY MEDICINE

## 2024-04-23 PROCEDURE — G0463 HOSPITAL OUTPT CLINIC VISIT: HCPCS

## 2024-04-23 PROCEDURE — 73110 X-RAY EXAM OF WRIST: CPT | Mod: 50

## 2024-04-23 PROCEDURE — G0463 HOSPITAL OUTPT CLINIC VISIT: HCPCS | Mod: 25

## 2024-04-23 PROCEDURE — 86431 RHEUMATOID FACTOR QUANT: CPT | Mod: ZL | Performed by: FAMILY MEDICINE

## 2024-04-23 PROCEDURE — 90715 TDAP VACCINE 7 YRS/> IM: CPT

## 2024-04-23 PROCEDURE — 84550 ASSAY OF BLOOD/URIC ACID: CPT | Mod: ZL | Performed by: FAMILY MEDICINE

## 2024-04-23 PROCEDURE — 86618 LYME DISEASE ANTIBODY: CPT | Mod: ZL | Performed by: FAMILY MEDICINE

## 2024-04-23 PROCEDURE — 36415 COLL VENOUS BLD VENIPUNCTURE: CPT | Mod: ZL | Performed by: FAMILY MEDICINE

## 2024-04-23 PROCEDURE — 86200 CCP ANTIBODY: CPT | Mod: ZL | Performed by: FAMILY MEDICINE

## 2024-04-23 PROCEDURE — 73130 X-RAY EXAM OF HAND: CPT | Mod: RT

## 2024-04-23 PROCEDURE — 86140 C-REACTIVE PROTEIN: CPT | Mod: ZL | Performed by: FAMILY MEDICINE

## 2024-04-23 ASSESSMENT — PAIN SCALES - GENERAL: PAINLEVEL: MILD PAIN (2)

## 2024-04-23 NOTE — PROGRESS NOTES
Nursing Notes:   Teodora Hdz LPN  4/23/2024  8:53 AM  Signed  Chief Complaint   Patient presents with    Pain     Right hand          Medication Reconciliation: complete    Teodora Hdz LPN        Franky Spencer is a 58 year old, presenting for the following health issues:  Pain (Right hand )        4/23/2024     8:50 AM   Additional Questions   Roomed by Teodora Hdz     Johanna is here today for a complaint of right hand pain.  Has bothered her for about a year.  Initially just her middle finger hurt, now has a dull ache all the time in her wrist and hand.  Very stiff after rest and in the morning.  Falls asleep a lot.  Often wakes in the middle of the night with pins/needles feeling in her hand.  She is right handed.  Her fingers will feel swollen at times. Left hand feels a little stiff/sore also, but worse on the right.  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  Entire hands get numb when this happens.  She also gets numbness in her hands if she is holding her phone for too long in one position.     History of Present Illness       Reason for visit:  My hand hurts  Symptom onset:  More than a month  Symptoms include:  My hand hurts and i cant close it at times  Symptom intensity:  Moderate  Symptom progression:  Worsening  Had these symptoms before:  No  What makes it worse:  When i wake up in the morning or when im sleeping  What makes it better:  Once i start using or roll cold therspy pain reliving gel on it    She eats 4 or more servings of fruits and vegetables daily.She consumes 0 sweetened beverage(s) daily.She exercises with enough effort to increase her heart rate 9 or less minutes per day.  She exercises with enough effort to increase her heart rate 4 days per week.   She is taking medications regularly.         Review of Systems  Constitutional, HEENT, cardiovascular, pulmonary, GI, , musculoskeletal, neuro, skin, endocrine and psych systems are negative, except as otherwise  noted.      Objective    /82   Pulse 82   Temp 97  F (36.1  C) (Tympanic)   Resp 16   Wt 101.2 kg (223 lb 3.2 oz)   LMP 10/09/2021 (Approximate)   SpO2 97%   Breastfeeding No   BMI 35.22 kg/m    Body mass index is 35.22 kg/m .  Physical Exam  Constitutional:       Appearance: Normal appearance.   HENT:      Head: Normocephalic.   Eyes:      Extraocular Movements: Extraocular movements intact.      Pupils: Pupils are equal, round, and reactive to light.   Musculoskeletal:      Comments: Bilateral wrists:  negative tinels, phalens bilaterally.  No reproduction of symptoms with palpation over ulnar grooves bilaterally.  No swelling, redness, warmth of joints of fingers/hand/wrist noted.   Neurological:      Mental Status: She is alert.          Results for orders placed or performed during the hospital encounter of 04/23/24   XR Hand Left G/E 3 Views     Status: None    Narrative    PROCEDURE:  XR HAND LEFT G/E 3 VIEWS    HISTORY: Pain of left hand    COMPARISON:  None.    TECHNIQUE:  3 views of the left hand were obtained.    FINDINGS:  No fracture or dislocation is identified. The joint spaces  are preserved.        Impression    IMPRESSION: Normal left hand      BITA WISDOM MD         SYSTEM ID:  [id number]   Results for orders placed or performed during the hospital encounter of 04/23/24   XR Wrist Right G/E 3 Views     Status: None    Narrative    PROCEDURE:  XR WRIST RIGHT G/E 3 VIEWS    HISTORY: Right wrist pain    COMPARISON:  None.    TECHNIQUE:  3 views of the right wrist were obtained.    FINDINGS:  Is an old ununited fracture of the ulnar styloid. The  distal radius is intact. The carpals and intercarpal joints appear  normal. The proximal metacarpals appear normal.       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      BITA WISDOM MD         SYSTEM ID:  [id number]   Results for orders placed or performed during the hospital encounter of 04/23/24   XR Wrist Left G/E 3  Views     Status: None    Narrative    PROCEDURE:  XR WRIST LEFT G/E 3 VIEWS    HISTORY: Pain in left wrist    COMPARISON:  None.    TECHNIQUE:  3 views of the left wrist were obtained.    FINDINGS:  There is an old ununited fracture of the ulnar styloid. The  distal radius is intact. Carpal bones and intercarpal joints are  normal.       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      BITA WISDOM MD         SYSTEM ID:  [id number]   Results for orders placed or performed during the hospital encounter of 04/23/24   XR Hand Right G/E 3 Views     Status: None    Narrative    PROCEDURE:  XR HAND RIGHT G/E 3 VIEWS    HISTORY: Pain of right hand    COMPARISON:  None.    TECHNIQUE:  3 views of the right hand were obtained.    FINDINGS:  No fracture or dislocation is identified. The joint spaces  are preserved.        Impression    IMPRESSION: Normal right hand      BITA WISDOM MD         SYSTEM ID:  [id number]   Results for orders placed or performed in visit on 04/23/24   CRP inflammation     Status: Abnormal   Result Value Ref Range    CRP Inflammation 8.84 (H) <5.00 mg/L   Erythrocyte sedimentation rate auto     Status: Normal   Result Value Ref Range    Erythrocyte Sedimentation Rate 9 0 - 30 mm/hr   Uric acid     Status: Normal   Result Value Ref Range    Uric Acid 3.4 2.4 - 5.7 mg/dL          ICD-10-CM    1. Pain of right hand  M79.641 XR Hand Right G/E 3 Views     Anti Nuclear Ban IgG by IFA with Reflex     Cyclic Citrullinated Peptide Antibody IgG     CRP inflammation     Erythrocyte sedimentation rate auto     Rheumatoid factor     Lyme Disease Total Abs Bld with Reflex to Confirm CLIA     Uric acid     Anti Nuclear Ban IgG by IFA with Reflex     Cyclic Citrullinated Peptide Antibody IgG     CRP inflammation     Erythrocyte sedimentation rate auto     Rheumatoid factor     Lyme Disease Total Abs Bld with Reflex to Confirm CLIA     Uric acid      2. Right wrist pain  M25.531 XR  Wrist Right G/E 3 Views     Anti Nuclear Ban IgG by IFA with Reflex     Cyclic Citrullinated Peptide Antibody IgG     CRP inflammation     Erythrocyte sedimentation rate auto     Rheumatoid factor     Lyme Disease Total Abs Bld with Reflex to Confirm CLIA     Uric acid     Anti Nuclear Ban IgG by IFA with Reflex     Cyclic Citrullinated Peptide Antibody IgG     CRP inflammation     Erythrocyte sedimentation rate auto     Rheumatoid factor     Lyme Disease Total Abs Bld with Reflex to Confirm CLIA     Uric acid      3. Pain of left hand  M79.642 XR Hand Left G/E 3 Views     Anti Nuclear Ban IgG by IFA with Reflex     Cyclic Citrullinated Peptide Antibody IgG     CRP inflammation     Erythrocyte sedimentation rate auto     Rheumatoid factor     Lyme Disease Total Abs Bld with Reflex to Confirm CLIA     Uric acid     Anti Nuclear Ban IgG by IFA with Reflex     Cyclic Citrullinated Peptide Antibody IgG     CRP inflammation     Erythrocyte sedimentation rate auto     Rheumatoid factor     Lyme Disease Total Abs Bld with Reflex to Confirm CLIA     Uric acid      4. Pain in left wrist  M25.532 XR Wrist Left G/E 3 Views     Anti Nuclear Ban IgG by IFA with Reflex     Cyclic Citrullinated Peptide Antibody IgG     CRP inflammation     Erythrocyte sedimentation rate auto     Rheumatoid factor     Lyme Disease Total Abs Bld with Reflex to Confirm CLIA     Uric acid     Anti Nuclear Ban IgG by IFA with Reflex     Cyclic Citrullinated Peptide Antibody IgG     CRP inflammation     Erythrocyte sedimentation rate auto     Rheumatoid factor     Lyme Disease Total Abs Bld with Reflex to Confirm CLIA     Uric acid      5. Paresthesia of both hands  R20.2 Adult Neurology  Referral      6. Need for Tdap vaccination  Z23 TDAP 10-64Y (ADACEL,BOOSTRIX)          X-rays showed bilateral ulnar styloid fractures, which are nonunions.  She only has a mild amount of pain in this area on her left side, but not really on the right.  She  really does not have much osteoarthritis to speak of.  She may have some underlying carpal tunnel syndrome with her paresthesias and we will evaluate that further with EMG of both upper extremities.  Also will evaluate further for the possibility of an inflammatory arthritis with labs as noted above.  She will be notified with results when available.  If there is a suggestion of underlying inflammatory process, consider rheumatology referral.  See #1.  See #1.  See #1.  See #1.  Tdap updated today.    She declined hepatitis B vaccine given her retired status and otherwise low risk.  Also declines Shingrix and COVID booster at this time.    No follow-ups on file.           Natalie Bhatti MD

## 2024-04-23 NOTE — NURSING NOTE
Chief Complaint   Patient presents with    Pain     Right hand          Medication Reconciliation: complete    Teodora Hdz, LPN

## 2024-04-24 LAB
ANA SER QL IF: NEGATIVE
B BURGDOR IGG+IGM SER QL: 0.03
CCP AB SER IA-ACNC: 1 U/ML
RHEUMATOID FACT SERPL-ACNC: <10 IU/ML

## 2024-04-25 ENCOUNTER — HOSPITAL ENCOUNTER (OUTPATIENT)
Dept: MAMMOGRAPHY | Facility: OTHER | Age: 59
Discharge: HOME | End: 2024-04-25
Attending: FAMILY MEDICINE | Admitting: FAMILY MEDICINE
Payer: COMMERCIAL

## 2024-04-25 DIAGNOSIS — Z12.31 VISIT FOR SCREENING MAMMOGRAM: ICD-10-CM

## 2024-04-25 PROCEDURE — 77063 BREAST TOMOSYNTHESIS BI: CPT

## 2024-04-30 ENCOUNTER — MYC MEDICAL ADVICE (OUTPATIENT)
Dept: FAMILY MEDICINE | Facility: OTHER | Age: 59
End: 2024-04-30
Payer: COMMERCIAL

## 2024-04-30 DIAGNOSIS — K21.9 GASTROESOPHAGEAL REFLUX DISEASE WITHOUT ESOPHAGITIS: Primary | ICD-10-CM

## 2024-04-30 NOTE — TELEPHONE ENCOUNTER
From 3/29/24 OV with PCJ:    After current dose/prescription of omeprazole, will have her switch to famotidine 20 mg a day. She is aware that for her weight in particular plays a role into her degree of heartburn. Also consider having her get an endoscopy at the time of her next colonoscopy.     Clarifying question(s) sent to patient.      Mili Angeles RN on 4/30/2024 at 4:53 PM

## 2024-05-01 RX ORDER — SUCRALFATE 1 G/1
1 TABLET ORAL 4 TIMES DAILY
Qty: 120 TABLET | Refills: 1 | Status: CANCELLED | OUTPATIENT
Start: 2024-05-01

## 2024-05-01 RX ORDER — OMEPRAZOLE 10 MG/1
20 CAPSULE, DELAYED RELEASE ORAL DAILY
Qty: 30 CAPSULE | Refills: 5 | Status: SHIPPED | OUTPATIENT
Start: 2024-05-01 | End: 2024-08-12

## 2024-05-01 NOTE — TELEPHONE ENCOUNTER
Per OV from 3/29:    Heartburn - much better now ; hasn't needed to take any additional Tums since starting on omeprazole.  She wonders about how long she should be taking this medication.     Routing to provider to review and respond.  Ree Martin RN on 5/1/2024 at 8:37 AM

## 2024-05-06 ENCOUNTER — MYC MEDICAL ADVICE (OUTPATIENT)
Dept: FAMILY MEDICINE | Facility: OTHER | Age: 59
End: 2024-05-06
Payer: COMMERCIAL

## 2024-05-06 NOTE — TELEPHONE ENCOUNTER
4/23/2024  Neurology order.   4/29/2024  Sent to Dr. Chavez's office for EMG at [facility].     Mili Angeles RN on 5/6/2024 at 4:59 PM

## 2024-05-14 ENCOUNTER — TELEPHONE (OUTPATIENT)
Dept: FAMILY MEDICINE | Facility: OTHER | Age: 59
End: 2024-05-14

## 2024-05-14 ENCOUNTER — OFFICE VISIT (OUTPATIENT)
Dept: NEUROLOGY | Facility: OTHER | Age: 59
End: 2024-05-14
Attending: PSYCHIATRY & NEUROLOGY
Payer: COMMERCIAL

## 2024-05-14 ENCOUNTER — MYC MEDICAL ADVICE (OUTPATIENT)
Dept: FAMILY MEDICINE | Facility: OTHER | Age: 59
End: 2024-05-14

## 2024-05-14 VITALS
OXYGEN SATURATION: 98 % | RESPIRATION RATE: 16 BRPM | HEART RATE: 82 BPM | DIASTOLIC BLOOD PRESSURE: 84 MMHG | SYSTOLIC BLOOD PRESSURE: 122 MMHG | TEMPERATURE: 97 F | HEIGHT: 67 IN | BODY MASS INDEX: 34.65 KG/M2 | WEIGHT: 220.8 LBS

## 2024-05-14 DIAGNOSIS — G56.03 BILATERAL CARPAL TUNNEL SYNDROME: Primary | ICD-10-CM

## 2024-05-14 PROCEDURE — 95912 NRV CNDJ TEST 11-12 STUDIES: CPT | Mod: 26 | Performed by: PSYCHIATRY & NEUROLOGY

## 2024-05-14 PROCEDURE — 95886 MUSC TEST DONE W/N TEST COMP: CPT | Performed by: PSYCHIATRY & NEUROLOGY

## 2024-05-14 PROCEDURE — 95886 MUSC TEST DONE W/N TEST COMP: CPT | Mod: 26 | Performed by: PSYCHIATRY & NEUROLOGY

## 2024-05-14 PROCEDURE — 95913 NRV CNDJ TEST 13/> STUDIES: CPT | Performed by: PSYCHIATRY & NEUROLOGY

## 2024-05-14 PROCEDURE — 99204 OFFICE O/P NEW MOD 45 MIN: CPT | Mod: 25 | Performed by: PSYCHIATRY & NEUROLOGY

## 2024-05-14 PROCEDURE — G0463 HOSPITAL OUTPT CLINIC VISIT: HCPCS

## 2024-05-14 ASSESSMENT — PAIN SCALES - GENERAL: PAINLEVEL: EXTREME PAIN (8)

## 2024-05-14 ASSESSMENT — PATIENT HEALTH QUESTIONNAIRE - PHQ9: SUM OF ALL RESPONSES TO PHQ QUESTIONS 1-9: 0

## 2024-05-14 NOTE — TELEPHONE ENCOUNTER
Please call - her EMG showed that she has bilateral carpal tunnel syndrome.  I have referred her to an Orthopedic surgeon.  Natalie Bhatti MD

## 2024-05-14 NOTE — LETTER
5/14/2024         RE: Johanna Cm  [id number] [facility] [address]        Dear Colleague,    Thank you for referring your patient, Johanna mC, to the [facility]. Please see a copy of my visit note below.    Referring physician: Natalie Soto MD    History: The patient relates that for a few months she has had recurrent paresthesia affecting the right hand more often than the left.  She also describes aching pain in the wrists and hands.  All 5 digits are involved.  Symptoms sometimes awaken her from sleep.  The history is equivocal with regard to loss of strength.  There has been no obvious injury but the patient relates that she has been doing quite a bit of work with her hands since she retired 5 years ago.  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    Past medical history: The patient has been chronically overweight but is not known to have diabetes.    Review of systems: Neck pain has not been a significant issue.  The patient does not have sensory symptoms in the lower extremities.    Family history: Negative for neuropathy.    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  Minimal remote history of smoking.    Physical examination: The patient is 66 inches tall and weighs 220 pounds.  There is equivocal weakness on the right for the abductors of the thumb.  Strength is normal and symmetric for the interossei, finger extensors and flexors, biceps and triceps.  Reflexes are 1+/4 bilaterally for the biceps, triceps and brachioradialis tendons.  Pinprick is well-preserved.  The gait is normal.    Nerve conduction studies motor nerve conduction testing was performed bilaterally for the median and ulnar nerves.  Both median nerves showed moderate latency prolongation, conduction block and slowing at the wrist.  The ulnar studies were normal and the H reflex latencies were normal throughout.    Orthodromic mixed conduction  studies were performed bilaterally for the median and ulnar nerves.  Antidromic sensory testing was performed for the radial nerves.  The ulnar and radial studies were normal but both median nerves showed moderately severe latency prolongation and slowing at the wrist.    Monopolar EMG needle examination: EMG was performed bilaterally for the first dorsal interosseous, extensor digitorum commonness, brachioradialis, flexor carpi radialis, and triceps.  All of the tested muscles showed normal insertional activity.  Motor units were normal in size with normal recruitment and interference.    Impression: The patient has moderate grade bilateral carpal tunnel syndrome.  The degree of conduction block seen for the median nerves is great enough that resolution is unlikely with nonsurgical treatment.  Other than carpal tunnel syndrome she is intact neurologically.    Findings were reviewed with the patient.      Again, thank you for allowing me to participate in the care of your patient.        Sincerely,        John Oneal MD

## 2024-05-14 NOTE — NURSING NOTE
"Chief Complaint   Patient presents with    Neurologic Problem     Bilateral Upper Extremity Paresthesia        Initial /84 (BP Location: Right arm, Patient Position: Chair, Cuff Size: Adult Large)   Pulse 82   Temp 97  F (36.1  C) (Temporal)   Resp 16   Ht 1.695 m (5' 6.75\")   Wt 100.2 kg (220 lb 12.8 oz)   LMP 10/09/2021 (Approximate)   SpO2 98%   Breastfeeding No   BMI 34.84 kg/m   Estimated body mass index is 34.84 kg/m  as calculated from the following:    Height as of this encounter: 1.695 m (5' 6.75\").    Weight as of this encounter: 100.2 kg (220 lb 12.8 oz).      Medication Reconciliation: Complete.       Jennifer Xiong LPN on 5/14/2024 at 10:13 AM     "

## 2024-05-15 NOTE — TELEPHONE ENCOUNTER
Patient informed of provider response. She states she was contacted by Orthopedics already and has an appointment scheduled.     Teodora Castorena CMA on 5/15/2024 at 11:53 AM

## 2024-06-04 ENCOUNTER — TRANSFERRED RECORDS (OUTPATIENT)
Dept: HEALTH INFORMATION MANAGEMENT | Facility: OTHER | Age: 59
End: 2024-06-04
Payer: COMMERCIAL

## 2024-06-26 ASSESSMENT — ANXIETY QUESTIONNAIRES
IF YOU CHECKED OFF ANY PROBLEMS ON THIS QUESTIONNAIRE, HOW DIFFICULT HAVE THESE PROBLEMS MADE IT FOR YOU TO DO YOUR WORK, TAKE CARE OF THINGS AT HOME, OR GET ALONG WITH OTHER PEOPLE: NOT DIFFICULT AT ALL
7. FEELING AFRAID AS IF SOMETHING AWFUL MIGHT HAPPEN: NOT AT ALL
5. BEING SO RESTLESS THAT IT IS HARD TO SIT STILL: NOT AT ALL
7. FEELING AFRAID AS IF SOMETHING AWFUL MIGHT HAPPEN: NOT AT ALL
3. WORRYING TOO MUCH ABOUT DIFFERENT THINGS: NOT AT ALL
GAD7 TOTAL SCORE: 0
4. TROUBLE RELAXING: NOT AT ALL
2. NOT BEING ABLE TO STOP OR CONTROL WORRYING: NOT AT ALL
6. BECOMING EASILY ANNOYED OR IRRITABLE: NOT AT ALL
GAD7 TOTAL SCORE: 0
1. FEELING NERVOUS, ANXIOUS, OR ON EDGE: NOT AT ALL
8. IF YOU CHECKED OFF ANY PROBLEMS, HOW DIFFICULT HAVE THESE MADE IT FOR YOU TO DO YOUR WORK, TAKE CARE OF THINGS AT HOME, OR GET ALONG WITH OTHER PEOPLE?: NOT DIFFICULT AT ALL

## 2024-06-30 NOTE — PROGRESS NOTES
Assessment & Plan       ICD-10-CM    1. Class 2 obesity due to excess calories without serious comorbidity with body mass index (BMI) of 37.0 to 37.9 in adult  E66.09     Z68.37       2. Anxiety-like symptoms  F41.9            ***    PDMP Review       None            {[facility] 2021 Documentation (Optional):921076}  {2021 E&M time (Optional):502358}         The longitudinal plan of care for *** was addressed during this visit. Due to the added complexity in care, I will continue to support Johanna Cm in the subsequent management of this condition(s) and with the ongoing continuity of care of this condition(s).          No follow-ups on file.    MARIAJOSE LARA MD  [facility]    Subjective   Johanna Cm is a 59 year old female  presenting for the following health issues: ***                          HPI Johanna Cm is a 59 year old female presents for follow up of weight loss management and anxiety symptoms.  ***        Current Outpatient Medications   Medication Sig Dispense Refill    acyclovir (ZOVIRAX) 400 MG tablet Take 1 tablet (400 mg) by mouth daily 90 tablet 3    Biotin 2500 MCG CAPS Take 1 capsule by mouth daily      budesonide (PULMICORT) 0.5 MG/2ML neb solution Squirt entire vial into [facility]ford med saline solution, mix, and irrigate each nostril until entire bottle empty.  Do this twice daily. 200 mL 11    buPROPion (WELLBUTRIN XL) 300 MG 24 hr tablet Take 1 tablet (300 mg) by mouth every morning 90 tablet 3    clotrimazole-betamethasone (LOTRISONE) 1-0.05 % external cream apply TO affected area two TO three TIMES DAILY 45 g 1    famotidine (PEPCID) 20 MG tablet Take 1 tablet (20 mg) by mouth daily 90 tablet 3    fluticasone (FLONASE) 50 MCG/ACT nasal spray Spray 2 sprays into both nostrils daily 16 g 11    metFORMIN (GLUCOPHAGE-XR) 750 MG 24 hr tablet Take 1 tablet (750 mg) by mouth daily (with dinner) 90 tablet 3    Multiple Vitamins-Minerals (MULTIVITAMIN WOMEN 50+)  TABS Take 1 tablet by mouth daily      omeprazole (PRILOSEC) 10 MG DR capsule Take 2 capsules (20 mg) by mouth daily 30 capsule 5    omeprazole (PRILOSEC) 20 MG DR capsule Take 1 capsule (20 mg) by mouth daily 30 capsule 0    Vitamin D3 (CHOLECALCIFEROL) 125 MCG (5000 UT) tablet Take 1 tablet by mouth daily       No current facility-administered medications for this visit.     Past Medical History:   Diagnosis Date    History of skin cancer 03/2024    SCC calf    Obesity     Primary osteoarthritis of both knees          {SUPERLIST (Optional):231508}      Review of Systems   {ROS COMP (Optional):797859}        11/21/2022    10:25 AM 5/14/2024    10:10 AM   PHQ   PHQ-9 Total Score 0 0   Q9: Thoughts of better off dead/self-harm past 2 weeks Not at all Not at all         11/21/2022    10:26 AM 6/26/2024    10:58 AM   SHANNON-7 SCORE   Total Score 0 (minimal anxiety) 0 (minimal anxiety)   Total Score 0 0             Objective  LMP 10/09/2021 (Approximate)    Wt Readings from Last 4 Encounters:   05/14/24 100.2 kg (220 lb 12.8 oz)   04/23/24 101.2 kg (223 lb 3.2 oz)   03/29/24 100.7 kg (222 lb)   02/12/24 102.6 kg (226 lb 3.2 oz)       Physical Exam   {Exam List (Optional):591621}    {Diagnostic Test Results (Optional):888613}          Answers submitted by the patient for this visit:  SHANNON-7 (Submitted on 6/26/2024)  SHANNON 7 TOTAL SCORE: 0  Diabetes Visit (Submitted on 6/26/2024)  Chief Complaint: Chronic problems general questions HPI Form  Frequency of checking blood sugars:: not at all  Diabetic concerns:: other  Paraesthesia present:: none of these symptoms

## 2024-07-01 ENCOUNTER — OFFICE VISIT (OUTPATIENT)
Dept: FAMILY MEDICINE | Facility: OTHER | Age: 59
End: 2024-07-01
Attending: FAMILY MEDICINE
Payer: COMMERCIAL

## 2024-07-01 VITALS
HEART RATE: 76 BPM | SYSTOLIC BLOOD PRESSURE: 130 MMHG | WEIGHT: 215.6 LBS | BODY MASS INDEX: 33.84 KG/M2 | TEMPERATURE: 96.8 F | RESPIRATION RATE: 16 BRPM | DIASTOLIC BLOOD PRESSURE: 80 MMHG | HEIGHT: 67 IN | OXYGEN SATURATION: 97 %

## 2024-07-01 DIAGNOSIS — F41.9 ANXIETY-LIKE SYMPTOMS: ICD-10-CM

## 2024-07-01 DIAGNOSIS — Z00.00 PHYSICAL EXAM, ANNUAL: Primary | ICD-10-CM

## 2024-07-01 DIAGNOSIS — E66.811 CLASS 1 OBESITY WITHOUT SERIOUS COMORBIDITY WITH BODY MASS INDEX (BMI) OF 34.0 TO 34.9 IN ADULT, UNSPECIFIED OBESITY TYPE: ICD-10-CM

## 2024-07-01 DIAGNOSIS — G56.03 BILATERAL CARPAL TUNNEL SYNDROME: ICD-10-CM

## 2024-07-01 DIAGNOSIS — J30.1 SEASONAL ALLERGIC RHINITIS DUE TO POLLEN: ICD-10-CM

## 2024-07-01 DIAGNOSIS — K21.9 GASTROESOPHAGEAL REFLUX DISEASE WITHOUT ESOPHAGITIS: ICD-10-CM

## 2024-07-01 LAB
CHOLEST SERPL-MCNC: 178 MG/DL
FASTING STATUS PATIENT QL REPORTED: NO
HDLC SERPL-MCNC: 78 MG/DL
LDLC SERPL CALC-MCNC: 84 MG/DL
NONHDLC SERPL-MCNC: 100 MG/DL
TRIGL SERPL-MCNC: 79 MG/DL

## 2024-07-01 PROCEDURE — 36415 COLL VENOUS BLD VENIPUNCTURE: CPT | Mod: ZL | Performed by: FAMILY MEDICINE

## 2024-07-01 PROCEDURE — G0463 HOSPITAL OUTPT CLINIC VISIT: HCPCS | Performed by: FAMILY MEDICINE

## 2024-07-01 PROCEDURE — 82465 ASSAY BLD/SERUM CHOLESTEROL: CPT | Mod: ZL | Performed by: FAMILY MEDICINE

## 2024-07-01 PROCEDURE — 99396 PREV VISIT EST AGE 40-64: CPT | Performed by: FAMILY MEDICINE

## 2024-07-01 PROCEDURE — 99213 OFFICE O/P EST LOW 20 MIN: CPT | Mod: 25 | Performed by: FAMILY MEDICINE

## 2024-07-01 RX ORDER — FLUTICASONE PROPIONATE 50 MCG
2 SPRAY, SUSPENSION (ML) NASAL DAILY
Qty: 16 G | Refills: 11 | Status: SHIPPED | OUTPATIENT
Start: 2024-07-01

## 2024-07-01 SDOH — HEALTH STABILITY: PHYSICAL HEALTH: ON AVERAGE, HOW MANY DAYS PER WEEK DO YOU ENGAGE IN MODERATE TO STRENUOUS EXERCISE (LIKE A BRISK WALK)?: 4 DAYS

## 2024-07-01 SDOH — HEALTH STABILITY: PHYSICAL HEALTH: ON AVERAGE, HOW MANY MINUTES DO YOU ENGAGE IN EXERCISE AT THIS LEVEL?: 90 MIN

## 2024-07-01 ASSESSMENT — PAIN SCALES - GENERAL: PAINLEVEL: NO PAIN (0)

## 2024-07-01 ASSESSMENT — SOCIAL DETERMINANTS OF HEALTH (SDOH): HOW OFTEN DO YOU GET TOGETHER WITH FRIENDS OR RELATIVES?: MORE THAN THREE TIMES A WEEK

## 2024-07-01 NOTE — PROGRESS NOTES
Preventive Care Visit  [facility] AND [facility]  MARIAJOSE LARA MD, Family Medicine  Jul 1, 2024      Assessment & Plan       ICD-10-CM    1. Physical exam, annual  Z00.00 Lipid Profile     Lipid Profile      2. Anxiety-like symptoms  F41.9       3. Gastroesophageal reflux disease without esophagitis  K21.9       4. Bilateral carpal tunnel syndrome  G56.03       5. Seasonal allergic rhinitis due to pollen  J30.1 fluticasone (FLONASE) 50 MCG/ACT nasal spray      6. Class 1 obesity without serious comorbidity with body mass index (BMI) of 34.0 to 34.9 in adult, unspecified obesity type  E66.9     Z68.34         Mammogram, colon cancer screening, cervical cancer screening up-to-date  Continue with bupropion and metformin for weight management.  She is also done very well with lifestyle changes.  Continue with weekly weight watchers check in  Carpal tunnel syndrome, wearing braces, plan for surgery in August.  With nasal irritation, this may be side effect from Flonase.  Will have her cut down from 2 sprays to 1 spray daily.  Continue omeprazole for GERD treatment.  With her weight loss, she has not yet noticed improvement in those symptoms.            Counseling  Appropriate preventive services were discussed with this patient, including applicable screening as appropriate for fall prevention, nutrition, physical activity, Tobacco-use cessation, weight loss and cognition.  Checklist reviewing preventive services available has been given to the patient.  Reviewed patient's diet, addressing concerns and/or questions.           Franky Spencer is a 59 year old, presenting for the following:  Physical (Annual well visit/F/U for weight loss medication, anxiety)        7/1/2024     8:57 AM   Additional Questions   Roomed by Megan DINH LPN        Health Care Directive  Patient has a Health Care Directive on file  Advance care planning document is on file and is current.    History of Present Illness        Diabetes:   She presents for follow up of diabetes.    She is not checking blood glucose.        She is concerned about other.    She is not experiencing numbness or burning in feet, excessive thirst, blurry vision, weight changes or redness, sores or blisters on feet.            Johanna Cm is a 59 year old female in for physical and medication follow up.    She is working on intentional wt loss and been successful with this.  Uses the [employer] john and finds this helpful to be accountable.    Is on metformin and Wellbutrin. Feels now like she's needed to be in the kitchen all the time.  Her  was just in and concern for his wt gain and elevated A1C.      Doesn't need a lot of food to get full    Diagnosed with bilateral CTS and will be having surgery later this summer.      Heartburn - not better yet.        Lab Results   Component Value Date    A1C 5.1 07/14/2023      Lab Results   Component Value Date    CHOL 178 05/20/2022     Lab Results   Component Value Date    HDL 79 05/20/2022     Lab Results   Component Value Date    LDL 74 05/20/2022     Lab Results   Component Value Date    TRIG 124 05/20/2022 7/1/2024   General Health   How would you rate your overall physical health? Good   Feel stress (tense, anxious, or unable to sleep) Very much      (!) STRESS CONCERN      7/1/2024   Nutrition   Three or more servings of calcium each day? Yes   Diet: Regular (no restrictions)   How many servings of fruit and vegetables per day? (!) 2-3   How many sweetened beverages each day? 0-1            7/1/2024   Exercise   Days per week of moderate/strenous exercise 4 days   Average minutes spent exercising at this level 90 min            7/1/2024   Social Factors   Frequency of gathering with friends or relatives More than three times a week   Worry food won't last until get money to buy more No   Food not last or not have enough money for food? No   Do you have housing? (Housing is defined as stable  permanent housing and does not include staying ouside in a car, in a tent, in an abandoned building, in an overnight shelter, or couch-surfing.) Yes   Are you worried about losing your housing? No   Lack of transportation? No   Unable to get utilities (heat,electricity)? No            7/1/2024   Fall Risk   Fallen 2 or more times in the past year? No   Trouble with walking or balance? No             7/1/2024   Dental   Dentist two times every year? Yes            7/1/2024   TB Screening   Were you born outside of the [place]? No            Today's PHQ-2 Score:       6/26/2024    10:59 AM   PHQ-2 ( 1999 [employer])   Q1: Little interest or pleasure in doing things 0   Q2: Feeling down, depressed or hopeless 0   PHQ-2 Score 0   Q1: Little interest or pleasure in doing things Not at all   Q2: Feeling down, depressed or hopeless Not at all   PHQ-2 Score 0           7/1/2024   Substance Use   Alcohol more than 3/day or more than 7/wk No   Do you use any other substances recreationally? No        Social History     Tobacco Use    Smoking status: Never     Passive exposure: Never    Smokeless tobacco: Never   Vaping Use    Vaping status: Never Used   Substance Use Topics    Alcohol use: Yes     Comment: maybe 2 a week    Drug use: Never           4/24/2023   LAST FHS-7 RESULTS   1st degree relative breast or ovarian cancer No   Any relative bilateral breast cancer No   Any male have breast cancer No   Any ONE woman have BOTH breast AND ovarian cancer No   Any woman with breast cancer before 50yrs No   2 or more relatives with breast AND/OR ovarian cancer No   2 or more relatives with breast AND/OR bowel cancer No           Mammogram Screening - Mammogram every 1-2 years updated in Health Maintenance based on mutual decision making        7/1/2024   STI Screening   New sexual partner(s) since last STI/HIV test? No        History of abnormal Pap smear: No - age 30- 64 PAP with HPV every 5 years recommended       ASCVD Risk  "  The 10-year ASCVD risk score (Joe MCINTYRE, et al., 2019) is: 1.9%    Values used to calculate the score:      Age: 59 years      Sex: Female      Is Non- : No      Diabetic: No      Tobacco smoker: No      Systolic Blood Pressure: 130 mmHg      Is BP treated: No      HDL Cholesterol: 78 mg/dL      Total Cholesterol: 178 mg/dL           Reviewed and updated as needed this visit by Provider                    Past Medical History:   Diagnosis Date    History of skin cancer 03/2024    SCC calf    Obesity     Primary osteoarthritis of both knees      Past Surgical History:   Procedure Laterality Date    BILATERAL KNEE ARTHROSCOPY Bilateral     right knee arthropscopy Right          Review of Systems  Painful tingling hands      Objective    Exam  /80 (BP Location: Right arm, Patient Position: Sitting, Cuff Size: Adult Regular)   Pulse 76   Temp 96.8  F (36  C) (Temporal)   Resp 16   Ht 1.695 m (5' 6.75\")   Wt 97.8 kg (215 lb 9.6 oz)   LMP 10/09/2021 (Approximate)   SpO2 97%   Breastfeeding No   BMI 34.02 kg/m     Estimated body mass index is 34.02 kg/m  as calculated from the following:    Height as of this encounter: 1.695 m (5' 6.75\").    Weight as of this encounter: 97.8 kg (215 lb 9.6 oz).  Wt Readings from Last 4 Encounters:   07/01/24 97.8 kg (215 lb 9.6 oz)   05/14/24 100.2 kg (220 lb 12.8 oz)   04/23/24 101.2 kg (223 lb 3.2 oz)   03/29/24 100.7 kg (222 lb)         Physical Exam  GENERAL: alert and no distress  EYES: Eyes grossly normal to inspection, PERRL and conjunctivae and sclerae normal  NECK: no adenopathy, no asymmetry, masses, or scars  RESP: lungs clear to auscultation   CV: regular rate and rhythm  ABDOMEN: soft, nontender, no hepatosplenomegaly, no masses and bowel sounds normal  MS: no gross musculoskeletal defects noted, no edema  SKIN: no suspicious lesions or rashes  NEURO: Normal strength and tone, mentation intact and speech normal  PSYCH: " mentation appears normal, affect normal/bright    Results for orders placed or performed in visit on 07/01/24   Lipid Profile     Status: None   Result Value Ref Range    Cholesterol 178 <200 mg/dL    Triglycerides 79 <150 mg/dL    Direct Measure HDL 78 >=50 mg/dL    LDL Cholesterol Calculated 84 <=100 mg/dL    Non HDL Cholesterol 100 <130 mg/dL    Patient Fasting > 8hrs? No     Narrative    Cholesterol  Desirable:  <200 mg/dL    Triglycerides  Normal:  Less than 150 mg/dL  Borderline High:  150-199 mg/dL  High:  200-499 mg/dL  Very High:  Greater than or equal to 500 mg/dL    Direct Measure HDL  Female:  Greater than or equal to 50 mg/dL   Male:  Greater than or equal to 40 mg/dL    LDL Cholesterol  Desirable:  <100mg/dL  Above Desirable:  100-129 mg/dL   Borderline High:  130-159 mg/dL   High:  160-189 mg/dL   Very High:  >= 190 mg/dL    Non HDL Cholesterol  Desirable:  130 mg/dL  Above Desirable:  130-159 mg/dL  Borderline High:  160-189 mg/dL  High:  190-219 mg/dL  Very High:  Greater than or equal to 220 mg/dL   '    Signed Electronically by: MARIAJOSE LARA MD

## 2024-07-01 NOTE — NURSING NOTE
"Chief Complaint   Patient presents with    Physical     Annual well visit  F/U for weight loss medication, anxiety       Initial /80 (BP Location: Right arm, Patient Position: Sitting, Cuff Size: Adult Regular)   Pulse 76   Temp 96.8  F (36  C) (Temporal)   Resp 16   Ht 1.695 m (5' 6.75\")   Wt 97.8 kg (215 lb 9.6 oz)   LMP 10/09/2021 (Approximate)   SpO2 97%   Breastfeeding No   BMI 34.02 kg/m   Estimated body mass index is 34.02 kg/m  as calculated from the following:    Height as of this encounter: 1.695 m (5' 6.75\").    Weight as of this encounter: 97.8 kg (215 lb 9.6 oz).    Medication Review: complete    The next two questions are to help us understand your food security.  If you are feeling you need any assistance in this area, we have resources available to support you today.          2/5/2024   SDOH- Food Insecurity   Within the past 12 months, did you worry that your food would run out before you got money to buy more? N   Within the past 12 months, did the food you bought just not last and you didn t have money to get more? N          Health Care Directive:  Patient has a Health Care Directive on file      Megan Sheets LPN      "

## 2024-08-09 DIAGNOSIS — K21.9 GASTROESOPHAGEAL REFLUX DISEASE WITHOUT ESOPHAGITIS: ICD-10-CM

## 2024-08-09 DIAGNOSIS — E66.09 CLASS 2 OBESITY DUE TO EXCESS CALORIES WITHOUT SERIOUS COMORBIDITY WITH BODY MASS INDEX (BMI) OF 37.0 TO 37.9 IN ADULT: ICD-10-CM

## 2024-08-09 DIAGNOSIS — E66.812 CLASS 2 OBESITY DUE TO EXCESS CALORIES WITHOUT SERIOUS COMORBIDITY WITH BODY MASS INDEX (BMI) OF 37.0 TO 37.9 IN ADULT: ICD-10-CM

## 2024-08-12 RX ORDER — BUPROPION HYDROCHLORIDE 300 MG/1
300 TABLET ORAL EVERY MORNING
Qty: 90 TABLET | Refills: 5 | OUTPATIENT
Start: 2024-08-12

## 2024-08-12 RX ORDER — OMEPRAZOLE 10 MG/1
20 CAPSULE, DELAYED RELEASE ORAL DAILY
Qty: 60 CAPSULE | Refills: 5 | Status: SHIPPED | OUTPATIENT
Start: 2024-08-12

## 2024-08-12 NOTE — TELEPHONE ENCOUNTER
[facility] sent Rx request for the following:      Requested Prescriptions   Pending Prescriptions Disp Refills    omeprazole (PRILOSEC) 10 MG DR capsule [Pharmacy Med Name: omeprazole 10 mg capsule,delayed release] 30 capsule 5     Sig: Take 2 capsules (20 mg) by mouth daily       PPI Protocol Passed - 8/9/2024  7:38 AM        Last Prescription Date:   5/1/24  Last Fill Qty/Refills:         30, R-5           buPROPion (WELLBUTRIN XL) 300 MG 24 hr tablet [Pharmacy Med Name: bupropion HCl  mg 24 hr tablet, extended release] 90 tablet 5     Sig: TAKE 1 TABLET BY MOUTH EVERY MORNING       Rx Protocol Bupropion Failed - 8/9/2024  7:38 AM        Failed - Medication is indicated for associated diagnosis     Medication is associated with one or more of the following diagnoses:  Anxiety  Bipolar Disorder  Depression  Smoking Cessation  Adjustment disorder with mixed anxiety and depressed mood  Adjustment disorder with anxiety  Tobacco use disorder  Tobacco abuse          Failed - Blood pressure on file in the past 12 months          Last Prescription Date:   3/29/24  Last Fill Qty/Refills:         90, R-3    Last Office Visit:              7/1/24   Future Office visit:             Next 5 appointments (look out 90 days)      Aug 14, 2024 10:00 AM  (Arrive by 9:45 AM)  Pre-Operative Physical with MANUEL Johnson [facility] ([facility] ) [address]  [place] [address]  [phone]     Aug 30, 2024 11:00 AM  (Arrive by 10:45 AM)  Return Visit with Stan Regalado MD  [facility] ([facility] ) [address]  [place] [address]  [phone]           Prescription approved per [facility] Refill Protocol.      Michelle Rose RN on 8/12/2024 at 3:01 PM

## 2024-08-14 ENCOUNTER — MYC MEDICAL ADVICE (OUTPATIENT)
Dept: INTERNAL MEDICINE | Facility: OTHER | Age: 59
End: 2024-08-14

## 2024-08-14 ENCOUNTER — OFFICE VISIT (OUTPATIENT)
Dept: INTERNAL MEDICINE | Facility: OTHER | Age: 59
End: 2024-08-14
Payer: COMMERCIAL

## 2024-08-14 VITALS
OXYGEN SATURATION: 97 % | WEIGHT: 216.8 LBS | DIASTOLIC BLOOD PRESSURE: 70 MMHG | HEART RATE: 78 BPM | TEMPERATURE: 97.4 F | SYSTOLIC BLOOD PRESSURE: 142 MMHG | BODY MASS INDEX: 34.21 KG/M2

## 2024-08-14 DIAGNOSIS — E66.812 CLASS 2 OBESITY WITHOUT SERIOUS COMORBIDITY WITH BODY MASS INDEX (BMI) OF 35.0 TO 35.9 IN ADULT, UNSPECIFIED OBESITY TYPE: ICD-10-CM

## 2024-08-14 DIAGNOSIS — R74.8 ELEVATED LIVER ENZYMES: ICD-10-CM

## 2024-08-14 DIAGNOSIS — Z01.818 PREOP GENERAL PHYSICAL EXAM: Primary | ICD-10-CM

## 2024-08-14 LAB
ALBUMIN SERPL BCG-MCNC: 4.5 G/DL (ref 3.5–5.2)
ALP SERPL-CCNC: 161 U/L (ref 40–150)
ALT SERPL W P-5'-P-CCNC: 21 U/L (ref 0–50)
ANION GAP SERPL CALCULATED.3IONS-SCNC: 10 MMOL/L (ref 7–15)
AST SERPL W P-5'-P-CCNC: 24 U/L (ref 0–45)
BASOPHILS # BLD AUTO: 0.1 10E3/UL (ref 0–0.2)
BASOPHILS NFR BLD AUTO: 1 %
BILIRUB SERPL-MCNC: 0.4 MG/DL
BUN SERPL-MCNC: 13.8 MG/DL (ref 6–20)
CALCIUM SERPL-MCNC: 9.9 MG/DL (ref 8.8–10.4)
CHLORIDE SERPL-SCNC: 102 MMOL/L (ref 98–107)
CREAT SERPL-MCNC: 0.77 MG/DL (ref 0.51–0.95)
EGFRCR SERPLBLD CKD-EPI 2021: 89 ML/MIN/1.73M2
EOSINOPHIL # BLD AUTO: 0.1 10E3/UL (ref 0–0.7)
EOSINOPHIL NFR BLD AUTO: 2 %
ERYTHROCYTE [DISTWIDTH] IN BLOOD BY AUTOMATED COUNT: 14 % (ref 10–15)
GLUCOSE SERPL-MCNC: 100 MG/DL (ref 70–99)
HCO3 SERPL-SCNC: 27 MMOL/L (ref 22–29)
HCT VFR BLD AUTO: 45.4 % (ref 35–47)
HGB BLD-MCNC: 14.5 G/DL (ref 11.7–15.7)
IMM GRANULOCYTES # BLD: 0 10E3/UL
IMM GRANULOCYTES NFR BLD: 0 %
LYMPHOCYTES # BLD AUTO: 2 10E3/UL (ref 0.8–5.3)
LYMPHOCYTES NFR BLD AUTO: 31 %
MCH RBC QN AUTO: 27.9 PG (ref 26.5–33)
MCHC RBC AUTO-ENTMCNC: 31.9 G/DL (ref 31.5–36.5)
MCV RBC AUTO: 87 FL (ref 78–100)
MONOCYTES # BLD AUTO: 0.5 10E3/UL (ref 0–1.3)
MONOCYTES NFR BLD AUTO: 7 %
NEUTROPHILS # BLD AUTO: 3.8 10E3/UL (ref 1.6–8.3)
NEUTROPHILS NFR BLD AUTO: 59 %
NRBC # BLD AUTO: 0 10E3/UL
NRBC BLD AUTO-RTO: 0 /100
PLATELET # BLD AUTO: 239 10E3/UL (ref 150–450)
POTASSIUM SERPL-SCNC: 4.7 MMOL/L (ref 3.4–5.3)
PROT SERPL-MCNC: 7.7 G/DL (ref 6.4–8.3)
RBC # BLD AUTO: 5.2 10E6/UL (ref 3.8–5.2)
SODIUM SERPL-SCNC: 139 MMOL/L (ref 135–145)
WBC # BLD AUTO: 6.4 10E3/UL (ref 4–11)

## 2024-08-14 PROCEDURE — 85025 COMPLETE CBC W/AUTO DIFF WBC: CPT | Mod: ZL

## 2024-08-14 PROCEDURE — 99214 OFFICE O/P EST MOD 30 MIN: CPT

## 2024-08-14 PROCEDURE — G0463 HOSPITAL OUTPT CLINIC VISIT: HCPCS

## 2024-08-14 PROCEDURE — G2211 COMPLEX E/M VISIT ADD ON: HCPCS

## 2024-08-14 PROCEDURE — 80053 COMPREHEN METABOLIC PANEL: CPT | Mod: ZL

## 2024-08-14 PROCEDURE — 36415 COLL VENOUS BLD VENIPUNCTURE: CPT | Mod: ZL

## 2024-08-14 NOTE — H&P (VIEW-ONLY)
Preoperative Evaluation  [facility]  [address]  [place] [address]  Phone: [phone]  Fax: [phone]  Primary Provider: MARIAJOSE LARA MD  Pre-op Performing Provider: MANUEL Johnson CNP  Aug 14, 2024             8/11/2024   Surgical Information   What procedure is being done? Bilateral carpal tunnel release. Bilateral endoscopic relese carpal tunnel   Facility or Hospital where procedure/surgery will be performed: [facility]   Who is doing the procedure / surgery? Dr. Fabricio Regalado   Date of surgery / procedure: 8/23/24 8/23/24   Time of surgery / procedure: TBD Unknown   Where do you plan to recover after surgery? Home with family at home with family        Fax number for surgical facility: Note does not need to be faxed, will be available electronically in Epic.    Assessment & Plan     The proposed surgical procedure is considered LOW risk.      ICD-10-CM    1. Preop general physical exam  Z01.818 Comprehensive Metabolic Panel     CBC with Platelets & Differential ([facility] Only)     Comprehensive Metabolic Panel     CBC with Platelets & Differential ([facility] Only)      2. Class 2 obesity without serious comorbidity with body mass index (BMI) of 35.0 to 35.9 in adult, unspecified obesity type  E66.9 Semaglutide-Weight Management (WEGOVY) 0.25 MG/0.5ML pen    Z68.35 Semaglutide-Weight Management (WEGOVY) 0.5 MG/0.5ML pen     Semaglutide-Weight Management (WEGOVY) 1 MG/0.5ML pen     Semaglutide-Weight Management (WEGOVY) 1.7 MG/0.75ML pen     Semaglutide-Weight Management (WEGOVY) 2.4 MG/0.75ML pen           Risks and Recommendations  The patient has the following additional risks and recommendations for perioperative complications:   - No identified additional risk factors other than previously addressed    - slightly elevated alk phos noted on lab work: Suspect fatty liver-normal bilirubin, AST and ALT.  We will follow-up  with a liver ultrasound, repeat hepatic panel in 2-4 weeks.  -Slightly hypertensive today 142/70 patient states that she checks her blood pressure at home and is normal tensive, she does have some component of whitecoat hypertension.  -Currently taking Wellbutrin and metformin for weight loss, continues to struggle with losing weight.  She is interested in trialing a GLP-1-prescription for Wegovy sent to pharmacy.  Instruction to avoid taking this within 7 days of surgery was given.      Preoperative Medication Instructions  Antiplatelet or Anticoagulation Medication Instructions   - Patient is on no antiplatelet or anticoagulation medications.    Additional Medication Instructions   - metformin: DO NOT TAKE day of surgery.    Recommendation  Approval given to proceed with proposed procedure, without further diagnostic evaluation.    Subjective   Johanna is a 59 year old, presenting for the following:  Pre-Op Exam    HPI related to upcoming procedure: Patient presents to clinic for preoperative evaluation for bilateral endoscopic carpal tunnel release.  Patient has had ongoing difficulty with numbness and pain in her hands.  Patient denies having any difficulties with anesthesia in the past, and is able to meet > 4 METS.       8/11/2024   Pre-Op Questionnaire   Have you ever had a heart attack or stroke? No   Have you ever had surgery on your heart or blood vessels, such as a stent placement, a coronary artery bypass, or surgery on an artery in your head, neck, heart, or legs? No   Do you have chest pain with activity? No   Do you have a history of heart failure? No   Do you currently have a cold, bronchitis or symptoms of other infection? No   Do you have a cough, shortness of breath, or wheezing? No   Do you or anyone in your family have previous history of blood clots? No   Do you or does anyone in your family have a serious bleeding problem such as prolonged bleeding following surgeries or cuts? No   Have you ever  had problems with anemia or been told to take iron pills? No   Have you had any abnormal blood loss such as black, tarry or bloody stools, or abnormal vaginal bleeding? No   Have you ever had a blood transfusion? No   Are you willing to have a blood transfusion if it is medically needed before, during, or after your surgery? Yes   Have you or any of your relatives ever had problems with anesthesia? No   Do you have sleep apnea, excessive snoring or daytime drowsiness? No   Do you have any artifical heart valves or other implanted medical devices like a pacemaker, defibrillator, or continuous glucose monitor? No   Do you have artificial joints? No   Are you allergic to latex? No        Health Care Directive  Patient has a Health Care Directive on file      Preoperative Review of    reviewed - no record of controlled substances prescribed.          Status of Chronic Conditions:  See problem list for active medical problems.  Problems all longstanding and stable, except as noted/documented.  See ROS for pertinent symptoms related to these conditions.    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  Do this twice daily. 200 mL 11    buPROPion (WELLBUTRIN XL) 300 MG 24 hr tablet Take 1 tablet (300 mg) by mouth every morning 90 tablet 3    clotrimazole-betamethasone (LOTRISONE) 1-0.05 % external cream apply TO affected area two TO three TIMES DAILY 45 g 1    fluticasone (FLONASE) 50 MCG/ACT nasal spray Spray 2 sprays into both nostrils daily 16 g 11    metFORMIN (GLUCOPHAGE-XR) 750 MG 24 hr tablet Take 1 tablet (750 mg) by mouth daily (with dinner) 90 tablet 3    Multiple Vitamins-Minerals (MULTIVITAMIN WOMEN 50+) TABS Take 1 tablet by mouth daily      omeprazole (PRILOSEC) 10 MG DR capsule Take 2 capsules (20 mg) by mouth daily 60 capsule 5    Vitamin D3 (CHOLECALCIFEROL) 125 MCG (5000 UT) tablet Take 1 tablet by mouth daily         Allergies   Allergen Reactions    Cats     Dust Mites         Social History     Tobacco Use    Smoking status: Never     Passive exposure: Never    Smokeless tobacco: Never   Substance Use Topics    Alcohol use: Yes     Comment: maybe 2 a week       History   Drug Use Unknown             Review of Systems  CONSTITUTIONAL: NEGATIVE for fever, chills, change in weight  ENT/MOUTH: NEGATIVE for ear, mouth and throat problems  RESP: NEGATIVE for significant cough or SOB  CV: NEGATIVE for chest pain, palpitations or peripheral edema    Objective    BP (!) 142/70   Pulse 78   Temp 97.4  F (36.3  C) (Tympanic)   Wt 98.3 kg (216 lb 12.8 oz)   LMP 10/09/2021 (Approximate)   SpO2 97%   BMI 34.21 kg/m     Estimated body mass index is 34.21 kg/m  as calculated from the following:    Height as of 7/1/24: 1.695 m (5' 6.75\").    Weight as of this encounter: 98.3 kg (216 lb 12.8 oz).  Physical Exam  Vitals reviewed.   Constitutional:       General: She is not in acute distress.     Appearance: She is well-developed.   HENT:      Head: Normocephalic and atraumatic.      Nose: " Nose normal.      Mouth/Throat:      Pharynx: No oropharyngeal exudate.   Eyes:      General: No scleral icterus.     Conjunctiva/sclera: Conjunctivae normal.      Pupils: Pupils are equal, round, and reactive to light.   Neck:      Thyroid: No thyromegaly.   Cardiovascular:      Rate and Rhythm: Normal rate and regular rhythm.      Heart sounds: No murmur heard.  Pulmonary:      Effort: Pulmonary effort is normal. No respiratory distress.      Breath sounds: Normal breath sounds. No wheezing.   Musculoskeletal:         General: No tenderness or deformity. Normal range of motion.      Cervical back: Normal range of motion and neck supple.   Skin:     General: Skin is warm and dry.      Findings: No rash.   Neurological:      Mental Status: She is alert and oriented to person, place, and time.      Cranial Nerves: No cranial nerve deficit.      Coordination: Coordination normal.   Psychiatric:         Behavior: Behavior normal.         Thought Content: Thought content normal.         Judgment: Judgment normal.         Diagnostics  Recent Results (from the past 24 hour(s))   Comprehensive Metabolic Panel    Collection Time: 08/14/24 10:15 AM   Result Value Ref Range    Sodium 139 135 - 145 mmol/L    Potassium 4.7 3.4 - 5.3 mmol/L    Carbon Dioxide (CO2) 27 22 - 29 mmol/L    Anion Gap 10 7 - 15 mmol/L    Urea Nitrogen 13.8 6.0 - 20.0 mg/dL    Creatinine 0.77 0.51 - 0.95 mg/dL    GFR Estimate 89 >60 mL/min/1.73m2    Calcium 9.9 8.8 - 10.4 mg/dL    Chloride 102 98 - 107 mmol/L    Glucose 100 (H) 70 - 99 mg/dL    Alkaline Phosphatase 161 (H) 40 - 150 U/L    AST 24 0 - 45 U/L    ALT 21 0 - 50 U/L    Protein Total 7.7 6.4 - 8.3 g/dL    Albumin 4.5 3.5 - 5.2 g/dL    Bilirubin Total 0.4 <=1.2 mg/dL   CBC with platelets and differential    Collection Time: 08/14/24 10:15 AM   Result Value Ref Range    WBC Count 6.4 4.0 - 11.0 10e3/uL    RBC Count 5.20 3.80 - 5.20 10e6/uL    Hemoglobin 14.5 11.7 - 15.7 g/dL    Hematocrit 45.4  35.0 - 47.0 %    MCV 87 78 - 100 fL    MCH 27.9 26.5 - 33.0 pg    MCHC 31.9 31.5 - 36.5 g/dL    RDW 14.0 10.0 - 15.0 %    Platelet Count 239 150 - 450 10e3/uL    % Neutrophils 59 %    % Lymphocytes 31 %    % Monocytes 7 %    % Eosinophils 2 %    % Basophils 1 %    % Immature Granulocytes 0 %    NRBCs per 100 WBC 0 <1 /100    Absolute Neutrophils 3.8 1.6 - 8.3 10e3/uL    Absolute Lymphocytes 2.0 0.8 - 5.3 10e3/uL    Absolute Monocytes 0.5 0.0 - 1.3 10e3/uL    Absolute Eosinophils 0.1 0.0 - 0.7 10e3/uL    Absolute Basophils 0.1 0.0 - 0.2 10e3/uL    Absolute Immature Granulocytes 0.0 <=0.4 10e3/uL    Absolute NRBCs 0.0 10e3/uL      No EKG required for low risk surgery (cataract, skin procedure, breast biopsy, etc).  No EKG required, no history of coronary heart disease, significant arrhythmia, peripheral arterial disease or other structural heart disease.    Revised Cardiac Risk Index (RCRI)  The patient has the following serious cardiovascular risks for perioperative complications:   - No serious cardiac risks = 0 points     RCRI Interpretation: 0 points: Class I (very low risk - 0.4% complication rate)         Signed Electronically by: MANUEL Johnson CNP  A copy of this evaluation report is provided to the requesting physician.     The longitudinal plan of care for the diagnosis(es)/condition(s) as documented were addressed during this visit. Due to the added complexity in care, I will continue to support Johanna in the subsequent management and with ongoing continuity of care.

## 2024-08-14 NOTE — TELEPHONE ENCOUNTER
8/14/24  OV with Rell for pre-op physical.     Alkaline Phosphatase 161.  (109 1 year ago).      Mili Angeles RN on 8/14/2024 at 2:01 PM

## 2024-08-14 NOTE — NURSING NOTE
"Chief Complaint   Patient presents with    Pre-Op Exam     Patient here for pre-op.  Initial BP (!) 142/70   Pulse 78   Temp 97.4  F (36.3  C) (Tympanic)   Wt 98.3 kg (216 lb 12.8 oz)   LMP 10/09/2021 (Approximate)   SpO2 97%   BMI 34.21 kg/m   Estimated body mass index is 34.21 kg/m  as calculated from the following:    Height as of 7/1/24: 1.695 m (5' 6.75\").    Weight as of this encounter: 98.3 kg (216 lb 12.8 oz).  Medication Review: complete    The next two questions are to help us understand your food security.  If you are feeling you need any assistance in this area, we have resources available to support you today.          7/1/2024   SDOH- Food Insecurity   Within the past 12 months, did you worry that your food would run out before you got money to buy more? N   Within the past 12 months, did the food you bought just not last and you didn t have money to get more? N            Health Care Directive:  Patient has a Health Care Directive on file      Rach Rowe MA      "

## 2024-08-14 NOTE — PATIENT INSTRUCTIONS

## 2024-08-14 NOTE — PROGRESS NOTES
Preoperative Evaluation  [facility]  [address]  [place] [address]  Phone: [phone]  Fax: [phone]  Primary Provider: MARIAJOSE LARA MD  Pre-op Performing Provider: MANUEL Johnson CNP  Aug 14, 2024             8/11/2024   Surgical Information   What procedure is being done? Bilateral carpal tunnel release. Bilateral endoscopic relese carpal tunnel   Facility or Hospital where procedure/surgery will be performed: [facility]   Who is doing the procedure / surgery? Dr. Fabricio Regalado   Date of surgery / procedure: 8/23/24 8/23/24   Time of surgery / procedure: TBD Unknown   Where do you plan to recover after surgery? Home with family at home with family        Fax number for surgical facility: Note does not need to be faxed, will be available electronically in Epic.    Assessment & Plan     The proposed surgical procedure is considered LOW risk.      ICD-10-CM    1. Preop general physical exam  Z01.818 Comprehensive Metabolic Panel     CBC with Platelets & Differential ([facility] Only)     Comprehensive Metabolic Panel     CBC with Platelets & Differential ([facility] Only)      2. Class 2 obesity without serious comorbidity with body mass index (BMI) of 35.0 to 35.9 in adult, unspecified obesity type  E66.9 Semaglutide-Weight Management (WEGOVY) 0.25 MG/0.5ML pen    Z68.35 Semaglutide-Weight Management (WEGOVY) 0.5 MG/0.5ML pen     Semaglutide-Weight Management (WEGOVY) 1 MG/0.5ML pen     Semaglutide-Weight Management (WEGOVY) 1.7 MG/0.75ML pen     Semaglutide-Weight Management (WEGOVY) 2.4 MG/0.75ML pen           Risks and Recommendations  The patient has the following additional risks and recommendations for perioperative complications:   - No identified additional risk factors other than previously addressed    - slightly elevated alk phos noted on lab work: Suspect fatty liver-normal bilirubin, AST and ALT.  We will follow-up  with a liver ultrasound, repeat hepatic panel in 2-4 weeks.  -Slightly hypertensive today 142/70 patient states that she checks her blood pressure at home and is normal tensive, she does have some component of whitecoat hypertension.  -Currently taking Wellbutrin and metformin for weight loss, continues to struggle with losing weight.  She is interested in trialing a GLP-1-prescription for Wegovy sent to pharmacy.  Instruction to avoid taking this within 7 days of surgery was given.      Preoperative Medication Instructions  Antiplatelet or Anticoagulation Medication Instructions   - Patient is on no antiplatelet or anticoagulation medications.    Additional Medication Instructions   - metformin: DO NOT TAKE day of surgery.    Recommendation  Approval given to proceed with proposed procedure, without further diagnostic evaluation.    Subjective   Johanna is a 59 year old, presenting for the following:  Pre-Op Exam    HPI related to upcoming procedure: Patient presents to clinic for preoperative evaluation for bilateral endoscopic carpal tunnel release.  Patient has had ongoing difficulty with numbness and pain in her hands.  Patient denies having any difficulties with anesthesia in the past, and is able to meet > 4 METS.       8/11/2024   Pre-Op Questionnaire   Have you ever had a heart attack or stroke? No   Have you ever had surgery on your heart or blood vessels, such as a stent placement, a coronary artery bypass, or surgery on an artery in your head, neck, heart, or legs? No   Do you have chest pain with activity? No   Do you have a history of heart failure? No   Do you currently have a cold, bronchitis or symptoms of other infection? No   Do you have a cough, shortness of breath, or wheezing? No   Do you or anyone in your family have previous history of blood clots? No   Do you or does anyone in your family have a serious bleeding problem such as prolonged bleeding following surgeries or cuts? No   Have you ever  had problems with anemia or been told to take iron pills? No   Have you had any abnormal blood loss such as black, tarry or bloody stools, or abnormal vaginal bleeding? No   Have you ever had a blood transfusion? No   Are you willing to have a blood transfusion if it is medically needed before, during, or after your surgery? Yes   Have you or any of your relatives ever had problems with anesthesia? No   Do you have sleep apnea, excessive snoring or daytime drowsiness? No   Do you have any artifical heart valves or other implanted medical devices like a pacemaker, defibrillator, or continuous glucose monitor? No   Do you have artificial joints? No   Are you allergic to latex? No        Health Care Directive  Patient has a Health Care Directive on file      Preoperative Review of    reviewed - no record of controlled substances prescribed.          Status of Chronic Conditions:  See problem list for active medical problems.  Problems all longstanding and stable, except as noted/documented.  See ROS for pertinent symptoms related to these conditions.    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  Do this twice daily. 200 mL 11    buPROPion (WELLBUTRIN XL) 300 MG 24 hr tablet Take 1 tablet (300 mg) by mouth every morning 90 tablet 3    clotrimazole-betamethasone (LOTRISONE) 1-0.05 % external cream apply TO affected area two TO three TIMES DAILY 45 g 1    fluticasone (FLONASE) 50 MCG/ACT nasal spray Spray 2 sprays into both nostrils daily 16 g 11    metFORMIN (GLUCOPHAGE-XR) 750 MG 24 hr tablet Take 1 tablet (750 mg) by mouth daily (with dinner) 90 tablet 3    Multiple Vitamins-Minerals (MULTIVITAMIN WOMEN 50+) TABS Take 1 tablet by mouth daily      omeprazole (PRILOSEC) 10 MG DR capsule Take 2 capsules (20 mg) by mouth daily 60 capsule 5    Vitamin D3 (CHOLECALCIFEROL) 125 MCG (5000 UT) tablet Take 1 tablet by mouth daily         Allergies   Allergen Reactions    Cats     Dust Mites         Social History     Tobacco Use    Smoking status: Never     Passive exposure: Never    Smokeless tobacco: Never   Substance Use Topics    Alcohol use: Yes     Comment: maybe 2 a week       History   Drug Use Unknown             Review of Systems  CONSTITUTIONAL: NEGATIVE for fever, chills, change in weight  ENT/MOUTH: NEGATIVE for ear, mouth and throat problems  RESP: NEGATIVE for significant cough or SOB  CV: NEGATIVE for chest pain, palpitations or peripheral edema    Objective    BP (!) 142/70   Pulse 78   Temp 97.4  F (36.3  C) (Tympanic)   Wt 98.3 kg (216 lb 12.8 oz)   LMP 10/09/2021 (Approximate)   SpO2 97%   BMI 34.21 kg/m     Estimated body mass index is 34.21 kg/m  as calculated from the following:    Height as of 7/1/24: 1.695 m (5' 6.75\").    Weight as of this encounter: 98.3 kg (216 lb 12.8 oz).  Physical Exam  Vitals reviewed.   Constitutional:       General: She is not in acute distress.     Appearance: She is well-developed.   HENT:      Head: Normocephalic and atraumatic.      Nose: " Nose normal.      Mouth/Throat:      Pharynx: No oropharyngeal exudate.   Eyes:      General: No scleral icterus.     Conjunctiva/sclera: Conjunctivae normal.      Pupils: Pupils are equal, round, and reactive to light.   Neck:      Thyroid: No thyromegaly.   Cardiovascular:      Rate and Rhythm: Normal rate and regular rhythm.      Heart sounds: No murmur heard.  Pulmonary:      Effort: Pulmonary effort is normal. No respiratory distress.      Breath sounds: Normal breath sounds. No wheezing.   Musculoskeletal:         General: No tenderness or deformity. Normal range of motion.      Cervical back: Normal range of motion and neck supple.   Skin:     General: Skin is warm and dry.      Findings: No rash.   Neurological:      Mental Status: She is alert and oriented to person, place, and time.      Cranial Nerves: No cranial nerve deficit.      Coordination: Coordination normal.   Psychiatric:         Behavior: Behavior normal.         Thought Content: Thought content normal.         Judgment: Judgment normal.         Diagnostics  Recent Results (from the past 24 hour(s))   Comprehensive Metabolic Panel    Collection Time: 08/14/24 10:15 AM   Result Value Ref Range    Sodium 139 135 - 145 mmol/L    Potassium 4.7 3.4 - 5.3 mmol/L    Carbon Dioxide (CO2) 27 22 - 29 mmol/L    Anion Gap 10 7 - 15 mmol/L    Urea Nitrogen 13.8 6.0 - 20.0 mg/dL    Creatinine 0.77 0.51 - 0.95 mg/dL    GFR Estimate 89 >60 mL/min/1.73m2    Calcium 9.9 8.8 - 10.4 mg/dL    Chloride 102 98 - 107 mmol/L    Glucose 100 (H) 70 - 99 mg/dL    Alkaline Phosphatase 161 (H) 40 - 150 U/L    AST 24 0 - 45 U/L    ALT 21 0 - 50 U/L    Protein Total 7.7 6.4 - 8.3 g/dL    Albumin 4.5 3.5 - 5.2 g/dL    Bilirubin Total 0.4 <=1.2 mg/dL   CBC with platelets and differential    Collection Time: 08/14/24 10:15 AM   Result Value Ref Range    WBC Count 6.4 4.0 - 11.0 10e3/uL    RBC Count 5.20 3.80 - 5.20 10e6/uL    Hemoglobin 14.5 11.7 - 15.7 g/dL    Hematocrit 45.4  35.0 - 47.0 %    MCV 87 78 - 100 fL    MCH 27.9 26.5 - 33.0 pg    MCHC 31.9 31.5 - 36.5 g/dL    RDW 14.0 10.0 - 15.0 %    Platelet Count 239 150 - 450 10e3/uL    % Neutrophils 59 %    % Lymphocytes 31 %    % Monocytes 7 %    % Eosinophils 2 %    % Basophils 1 %    % Immature Granulocytes 0 %    NRBCs per 100 WBC 0 <1 /100    Absolute Neutrophils 3.8 1.6 - 8.3 10e3/uL    Absolute Lymphocytes 2.0 0.8 - 5.3 10e3/uL    Absolute Monocytes 0.5 0.0 - 1.3 10e3/uL    Absolute Eosinophils 0.1 0.0 - 0.7 10e3/uL    Absolute Basophils 0.1 0.0 - 0.2 10e3/uL    Absolute Immature Granulocytes 0.0 <=0.4 10e3/uL    Absolute NRBCs 0.0 10e3/uL      No EKG required for low risk surgery (cataract, skin procedure, breast biopsy, etc).  No EKG required, no history of coronary heart disease, significant arrhythmia, peripheral arterial disease or other structural heart disease.    Revised Cardiac Risk Index (RCRI)  The patient has the following serious cardiovascular risks for perioperative complications:   - No serious cardiac risks = 0 points     RCRI Interpretation: 0 points: Class I (very low risk - 0.4% complication rate)         Signed Electronically by: MANUEL Johnson CNP  A copy of this evaluation report is provided to the requesting physician.     The longitudinal plan of care for the diagnosis(es)/condition(s) as documented were addressed during this visit. Due to the added complexity in care, I will continue to support Johanna in the subsequent management and with ongoing continuity of care.

## 2024-08-15 NOTE — TELEPHONE ENCOUNTER
Lab Comments:      Johanna-alk phos level is slightly elevated.  Suspect this is related to fatty liver.  I recommend that you continue taking your metformin.I ordered a liver ultrasound for further evaluation of this.  Please hold any Tylenol or alcohol in the meantime.  I recommend that you make a lab only visit in the next 2 to 4 weeks to recheck your liver enzymes.  Please make sure that if you are able to get Wegovy at the pharmacy that you do not start this until after surgery.  Please let me know if you have any questions.   Written by MANUEL Johnson CNP on 8/14/2024  3:22 PM CDT  Seen by patient Johanna Cm on 8/15/2024 12:35 PM    Mili Angeles RN on 8/15/2024 at 1:56 PM

## 2024-08-16 ENCOUNTER — TELEPHONE (OUTPATIENT)
Dept: FAMILY MEDICINE | Facility: OTHER | Age: 59
End: 2024-08-16
Payer: COMMERCIAL

## 2024-08-16 NOTE — TELEPHONE ENCOUNTER
[facility]-  Received call from [facility] regarding Wegovy rx.  They are asking if she can use Phentermine or Benzophetamine.  Call Johanna with questions [phone].

## 2024-08-18 NOTE — TELEPHONE ENCOUNTER
Let patient know that the PA was unsuccessful.  Options:  Pay out of pocket for Wegovy.  Or follow up to discuss other options.  Other options are controlled substances.  Sera Pate MD

## 2024-08-22 ENCOUNTER — ANESTHESIA EVENT (OUTPATIENT)
Dept: SURGERY | Facility: OTHER | Age: 59
End: 2024-08-22
Payer: COMMERCIAL

## 2024-08-23 ENCOUNTER — ANESTHESIA (OUTPATIENT)
Dept: SURGERY | Facility: OTHER | Age: 59
End: 2024-08-23
Payer: COMMERCIAL

## 2024-08-23 ENCOUNTER — HOSPITAL ENCOUNTER (OUTPATIENT)
Facility: OTHER | Age: 59
Discharge: HOME | End: 2024-08-23
Attending: SPECIALIST | Admitting: SPECIALIST
Payer: COMMERCIAL

## 2024-08-23 VITALS
DIASTOLIC BLOOD PRESSURE: 86 MMHG | BODY MASS INDEX: 33.9 KG/M2 | OXYGEN SATURATION: 97 % | TEMPERATURE: 96.8 F | HEIGHT: 67 IN | HEART RATE: 71 BPM | WEIGHT: 216 LBS | SYSTOLIC BLOOD PRESSURE: 179 MMHG

## 2024-08-23 DIAGNOSIS — G56.03 BILATERAL CARPAL TUNNEL SYNDROME: Primary | ICD-10-CM

## 2024-08-23 PROCEDURE — 360N000076 HC SURGERY LEVEL 3, PER MIN: Performed by: SPECIALIST

## 2024-08-23 PROCEDURE — 250N000011 HC RX IP 250 OP 636: Performed by: SPECIALIST

## 2024-08-23 PROCEDURE — 250N000009 HC RX 250: Performed by: SPECIALIST

## 2024-08-23 PROCEDURE — 272N000001 HC OR GENERAL SUPPLY STERILE: Performed by: SPECIALIST

## 2024-08-23 PROCEDURE — 250N000011 HC RX IP 250 OP 636

## 2024-08-23 PROCEDURE — 250N000025 HC SEVOFLURANE, PER MIN: Performed by: SPECIALIST

## 2024-08-23 PROCEDURE — 29848 WRIST ENDOSCOPY/SURGERY: CPT

## 2024-08-23 PROCEDURE — 370N000017 HC ANESTHESIA TECHNICAL FEE, PER MIN: Performed by: SPECIALIST

## 2024-08-23 PROCEDURE — 29848 WRIST ENDOSCOPY/SURGERY: CPT | Mod: 50 | Performed by: SPECIALIST

## 2024-08-23 PROCEDURE — 710N000012 HC RECOVERY PHASE 2, PER MINUTE: Performed by: SPECIALIST

## 2024-08-23 PROCEDURE — 250N000009 HC RX 250

## 2024-08-23 PROCEDURE — 258N000003 HC RX IP 258 OP 636: Performed by: NURSE ANESTHETIST, CERTIFIED REGISTERED

## 2024-08-23 PROCEDURE — 999N000141 HC STATISTIC PRE-PROCEDURE NURSING ASSESSMENT: Performed by: SPECIALIST

## 2024-08-23 RX ORDER — SODIUM CHLORIDE, SODIUM LACTATE, POTASSIUM CHLORIDE, CALCIUM CHLORIDE 600; 310; 30; 20 MG/100ML; MG/100ML; MG/100ML; MG/100ML
INJECTION, SOLUTION INTRAVENOUS CONTINUOUS
Status: DISCONTINUED | OUTPATIENT
Start: 2024-08-23 | End: 2024-08-23 | Stop reason: HOSPADM

## 2024-08-23 RX ORDER — FENTANYL CITRATE 50 UG/ML
25 INJECTION, SOLUTION INTRAMUSCULAR; INTRAVENOUS
Status: DISCONTINUED | OUTPATIENT
Start: 2024-08-23 | End: 2024-08-23 | Stop reason: HOSPADM

## 2024-08-23 RX ORDER — NALOXONE HYDROCHLORIDE 0.4 MG/ML
0.1 INJECTION, SOLUTION INTRAMUSCULAR; INTRAVENOUS; SUBCUTANEOUS
Status: DISCONTINUED | OUTPATIENT
Start: 2024-08-23 | End: 2024-08-23 | Stop reason: HOSPADM

## 2024-08-23 RX ORDER — DEXAMETHASONE SODIUM PHOSPHATE 10 MG/ML
4 INJECTION, SOLUTION INTRAMUSCULAR; INTRAVENOUS
Status: DISCONTINUED | OUTPATIENT
Start: 2024-08-23 | End: 2024-08-23 | Stop reason: HOSPADM

## 2024-08-23 RX ORDER — PROPOFOL 10 MG/ML
INJECTION, EMULSION INTRAVENOUS PRN
Status: DISCONTINUED | OUTPATIENT
Start: 2024-08-23 | End: 2024-08-23

## 2024-08-23 RX ORDER — ONDANSETRON 4 MG/1
4 TABLET, ORALLY DISINTEGRATING ORAL EVERY 30 MIN PRN
Status: DISCONTINUED | OUTPATIENT
Start: 2024-08-23 | End: 2024-08-23 | Stop reason: HOSPADM

## 2024-08-23 RX ORDER — LIDOCAINE HYDROCHLORIDE 20 MG/ML
INJECTION, SOLUTION INFILTRATION; PERINEURAL PRN
Status: DISCONTINUED | OUTPATIENT
Start: 2024-08-23 | End: 2024-08-23

## 2024-08-23 RX ORDER — ONDANSETRON 2 MG/ML
INJECTION INTRAMUSCULAR; INTRAVENOUS PRN
Status: DISCONTINUED | OUTPATIENT
Start: 2024-08-23 | End: 2024-08-23

## 2024-08-23 RX ORDER — ONDANSETRON 2 MG/ML
4 INJECTION INTRAMUSCULAR; INTRAVENOUS EVERY 30 MIN PRN
Status: DISCONTINUED | OUTPATIENT
Start: 2024-08-23 | End: 2024-08-23 | Stop reason: HOSPADM

## 2024-08-23 RX ORDER — KETOROLAC TROMETHAMINE 30 MG/ML
INJECTION, SOLUTION INTRAMUSCULAR; INTRAVENOUS PRN
Status: DISCONTINUED | OUTPATIENT
Start: 2024-08-23 | End: 2024-08-23

## 2024-08-23 RX ORDER — OXYCODONE HYDROCHLORIDE 5 MG/1
5 TABLET ORAL
Status: DISCONTINUED | OUTPATIENT
Start: 2024-08-23 | End: 2024-08-23 | Stop reason: HOSPADM

## 2024-08-23 RX ORDER — DEXAMETHASONE SODIUM PHOSPHATE 4 MG/ML
INJECTION, SOLUTION INTRA-ARTICULAR; INTRALESIONAL; INTRAMUSCULAR; INTRAVENOUS; SOFT TISSUE PRN
Status: DISCONTINUED | OUTPATIENT
Start: 2024-08-23 | End: 2024-08-23

## 2024-08-23 RX ORDER — OXYCODONE HYDROCHLORIDE 5 MG/1
10 TABLET ORAL
Status: DISCONTINUED | OUTPATIENT
Start: 2024-08-23 | End: 2024-08-23 | Stop reason: HOSPADM

## 2024-08-23 RX ORDER — PROPOFOL 10 MG/ML
INJECTION, EMULSION INTRAVENOUS CONTINUOUS PRN
Status: DISCONTINUED | OUTPATIENT
Start: 2024-08-23 | End: 2024-08-23

## 2024-08-23 RX ORDER — LIDOCAINE 40 MG/G
CREAM TOPICAL
Status: DISCONTINUED | OUTPATIENT
Start: 2024-08-23 | End: 2024-08-23 | Stop reason: HOSPADM

## 2024-08-23 RX ADMIN — LIDOCAINE HYDROCHLORIDE 60 MG: 20 INJECTION, SOLUTION INFILTRATION; PERINEURAL at 09:21

## 2024-08-23 RX ADMIN — SODIUM CHLORIDE, POTASSIUM CHLORIDE, SODIUM LACTATE AND CALCIUM CHLORIDE 100 ML/HR: 600; 310; 30; 20 INJECTION, SOLUTION INTRAVENOUS at 09:10

## 2024-08-23 RX ADMIN — DEXAMETHASONE SODIUM PHOSPHATE 4 MG: 4 INJECTION, SOLUTION INTRA-ARTICULAR; INTRALESIONAL; INTRAMUSCULAR; INTRAVENOUS; SOFT TISSUE at 09:30

## 2024-08-23 RX ADMIN — MIDAZOLAM HYDROCHLORIDE 2 MG: 1 INJECTION, SOLUTION INTRAMUSCULAR; INTRAVENOUS at 09:15

## 2024-08-23 RX ADMIN — PROPOFOL 30 MG: 10 INJECTION, EMULSION INTRAVENOUS at 09:32

## 2024-08-23 RX ADMIN — PROPOFOL 140 MCG/KG/MIN: 10 INJECTION, EMULSION INTRAVENOUS at 09:21

## 2024-08-23 RX ADMIN — KETOROLAC TROMETHAMINE 30 MG: 30 INJECTION, SOLUTION INTRAMUSCULAR at 09:52

## 2024-08-23 RX ADMIN — ONDANSETRON HYDROCHLORIDE 4 MG: 2 SOLUTION INTRAMUSCULAR; INTRAVENOUS at 09:30

## 2024-08-23 ASSESSMENT — ACTIVITIES OF DAILY LIVING (ADL)
ADLS_ACUITY_SCORE: 35

## 2024-08-23 NOTE — ANESTHESIA PREPROCEDURE EVALUATION
Anesthesia Pre-Procedure Evaluation    Patient: Johanna Cm   MRN: [id number] : 1965        Procedure : Procedure(s):  Endoscopic release carpal tunnel          Past Medical History:   Diagnosis Date    History of skin cancer 2024    SCC calf    Obesity     Primary osteoarthritis of both knees       Past Surgical History:   Procedure Laterality Date    BILATERAL KNEE ARTHROSCOPY Bilateral     right knee arthropscopy Right       Allergies   Allergen Reactions    Cats     Dust Mites       Social History     Tobacco Use    Smoking status: Never     Passive exposure: Never    Smokeless tobacco: Never   Substance Use Topics    Alcohol use: Yes     Comment: maybe 2 a week      Wt Readings from Last 1 Encounters:   24 98.3 kg (216 lb 12.8 oz)        Anesthesia Evaluation   Pt has had prior anesthetic. Type: MAC and General.    History of anesthetic complications  - PONV.      ROS/MED HX  ENT/Pulmonary: Comment: Hx of Acute maxillary sinusitis      Neurologic:  - neg neurologic ROS     Cardiovascular:  - neg cardiovascular ROS     METS/Exercise Tolerance: >4 METS    Hematologic:       Musculoskeletal:       GI/Hepatic: Comment: Elevated liver enzymes on last labs   Patient relates she has a recheck at the end of the month      Renal/Genitourinary:       Endo:     (+)               Obesity,       Psychiatric/Substance Use:  - neg psychiatric ROS     Infectious Disease:       Malignancy:       Other:            Physical Exam    Airway        Mallampati: I   TM distance: > 3 FB   Neck ROM: full   Mouth opening: > 3 cm    Respiratory Devices and Support         Dental       (+) Completely normal teeth      Cardiovascular   cardiovascular exam normal          Pulmonary   pulmonary exam normal                OUTSIDE LABS:  CBC:   Lab Results   Component Value Date    WBC 6.4 2024    HGB 14.5 2024    HCT 45.4 2024     2024     BMP:   Lab Results   Component Value Date      "08/14/2024     07/14/2023    POTASSIUM 4.7 08/14/2024    POTASSIUM 4.4 07/14/2023    CHLORIDE 102 08/14/2024    CHLORIDE 103 07/14/2023    CO2 27 08/14/2024    CO2 26 07/14/2023    BUN 13.8 08/14/2024    BUN 14.4 07/14/2023    CR 0.77 08/14/2024    CR 0.72 07/14/2023     (H) 08/14/2024    GLC 90 07/14/2023     COAGS: No results found for: \"PTT\", \"INR\", \"FIBR\"  POC: No results found for: \"BGM\", \"HCG\", \"HCGS\"  HEPATIC:   Lab Results   Component Value Date    ALBUMIN 4.5 08/14/2024    PROTTOTAL 7.7 08/14/2024    ALT 21 08/14/2024    AST 24 08/14/2024    ALKPHOS 161 (H) 08/14/2024    BILITOTAL 0.4 08/14/2024     OTHER:   Lab Results   Component Value Date    A1C 5.1 07/14/2023    SATNAM 9.9 08/14/2024    TSH 1.26 07/14/2023    SED 9 04/23/2024       Anesthesia Plan    ASA Status:  2    NPO Status:  NPO Appropriate    Anesthesia Type: MAC (general anesthesia discussed if necessary).              Consents    Anesthesia Plan(s) and associated risks, benefits, and realistic alternatives discussed. Questions answered and patient/representative(s) expressed understanding.     - Discussed: Risks, Benefits and Alternatives for BOTH SEDATION and the PROCEDURE were discussed     - Discussed with:  Patient      - Extended Intubation/Ventilatory Support Discussed: No.      - Patient is DNR/DNI Status: No     Use of blood products discussed: No .     Postoperative Care       PONV prophylaxis: Ondansetron (or other 5HT-3), Dexamethasone or Solumedrol     Comments:               MANUEL Oconnell CRNA    I have reviewed the pertinent notes and labs in the chart from the past 30 days and (re)examined the patient.  Any updates or changes from those notes are reflected in this note.                  "

## 2024-08-23 NOTE — ANESTHESIA CARE TRANSFER NOTE
Patient: Johanna Cm    Procedure: Procedure(s):  Endoscopic release carpal tunnel       Diagnosis: Carpal tunnel syndrome [G56.00]  Diagnosis Additional Information: No value filed.    Anesthesia Type:   MAC     Note:    Oropharynx: oropharynx clear of all foreign objects and spontaneously breathing  Level of Consciousness: awake  Oxygen Supplementation: room air    Independent Airway: airway patency satisfactory and stable  Dentition: dentition unchanged  Vital Signs Stable: post-procedure vital signs reviewed and stable  Report to RN Given: handoff report given  Patient transferred to: Phase II    Handoff Report: Identifed the Patient, Identified the Reponsible Provider, Reviewed the pertinent medical history, Discussed the surgical course, Reviewed Intra-OP anesthesia mangement and issues during anesthesia, Set expectations for post-procedure period and Allowed opportunity for questions and acknowledgement of understanding      Vitals:  Vitals Value Taken Time   BP     Temp     Pulse     Resp     SpO2 98 % 08/23/24 0958   Vitals shown include unfiled device data.    Electronically Signed By: MANUEL Oconnell CRNA  August 23, 2024  9:59 AM

## 2024-08-23 NOTE — OP NOTE
Preoperative diagnosis: Bilateral carpal tunnel syndrome    Postoperative diagnosis: Same    Procedure performed: Bilateral endoscopic carpal tunnel release    Surgeon: Stan Regalado MD    Assistant: Jose De Jesus ALCOCER    Anesthesia: Local with IV sedation     Indications: This patient is a 59-year-old female with bilateral carpal tunnel syndrome confirmed by EMG she was offered endoscopic carpal tunnel release and likes to proceed    Procedure: Patient brought the op DC operating table pneumatic tourniquet was placed about both upper extremities and both upper extremities were prepped in abnormal sterile manner timeout procedure formed from surgical site patient needed procedure right upper extremity was addressed first this was elevated exsanguinated and the tourniquet was inflated to 250 mL of mercury.  A sterile marking pen was remarkable skin incisions.  Local anesthetic without epinephrine was instilled incision was made carried sharply down through skin through subcu tissue care careful spreading was performed.  Antebrachial fascia identified and distally based U-shaped flap was elevated.  Spatula was placed beneath the transverse carpal ligament followed by sequential dilators the endoscope was introduced to the wrist the distal aspect the transverse carpal and was well-visualized palmar pressure was applied blade was elevated and the transverse carpal release following this similar scopic portal could both sides of the transverse carpal can be identified the endoscope was then withdrawn from the wrist antebrachial fascia was released proximally with tenotomy scissors tourniquet was deflated circulation returned prompt of the hand fingers hemostasis with the index pressure skin was closed with interrupted 4 nylon suture.    Attention directed the left upper extremity identical procedure was performed patient brought through In stable condition.

## 2024-08-23 NOTE — DISCHARGE INSTRUCTIONS
Surgeon Contact Information  If you have questions or concerns related to your procedure please contact your surgeon through [facility] at [phone].    [facility]  Adult Discharge Orders & Instructions    ________________________________________________________________          For 12 hours after surgery  Get plenty of rest.  A responsible adult must stay with you for at least 12 hours after you leave the hospital.   You may feel lightheaded.  IF so, sit for a few minutes before standing.  Have someone help you get up.   You may have a slight fever. Call the doctor if your fever is over 101 F (38.3 C) (taken under the tongue) or lasts longer than 24 hours.  You may have a dry mouth, a sore throat, muscle aches or trouble sleeping.  These should go away after 24 hours.  Do not make important or legal decisions.  6.   Do not drive or use heavy equipment.  If you have weakness or tingling, don't drive or use heavy equipment until this feeling goes away.    To contact a doctor, call   [phone]_______________________

## 2024-08-23 NOTE — INTERVAL H&P NOTE
"The History and Physical has been reviewed, the patient has been examined and no changes have occurred in the patient's condition since the H & P was completed.      Clinical Conditions Present on Arrival:  Clinically Significant Risk Factors Present on Admission                      # Obesity: Estimated body mass index is 34.08 kg/m  as calculated from the following:    Height as of this encounter: 1.695 m (5' 6.75\").    Weight as of this encounter: 98 kg (216 lb).       "

## 2024-08-23 NOTE — ANESTHESIA POSTPROCEDURE EVALUATION
Patient: Johanna Cm    Procedure: Procedure(s):  Endoscopic release carpal tunnel       Anesthesia Type:  MAC    Note:  Disposition: Outpatient   Postop Pain Control: Uneventful            Sign Out: Well controlled pain   PONV: No   Neuro/Psych: Uneventful            Sign Out: Acceptable/Baseline neuro status   Airway/Respiratory: Uneventful            Sign Out: Acceptable/Baseline resp. status   CV/Hemodynamics: Uneventful            Sign Out: Acceptable CV status; No obvious hypovolemia; No obvious fluid overload   Other NRE: NONE   DID A NON-ROUTINE EVENT OCCUR?            Last vitals:  Vitals Value Taken Time   /96 08/23/24 1015   Temp 96.8  F (36  C) 08/23/24 0956   Pulse 70 08/23/24 1015   Resp     SpO2 97 % 08/23/24 1025   Vitals shown include unfiled device data.    Electronically Signed By: MANUEL Rincon CRNA  August 23, 2024  10:26 AM

## 2024-08-23 NOTE — OR NURSING
Johanna has been discharged to home at 1059 via ambulatory accompanied by NADIR Hanna And Dionicio, .    Written discharge instructions were provided to Johanna and Dionicio.  Prescriptions were none.  Patient states their pain is 0/10, and denies any nausea or dizziness upon discharge.    Patient and adult caring for them verbalize understanding of discharge instructions including no driving until tomorrow and no longer taking narcotic pain medications - no operating mechanical equipment and no making any important decisions.They understand reason for discharge, and necessary follow-up appointments.

## 2024-08-23 NOTE — BRIEF OP NOTE
[facility]    Brief Operative Note    Pre-operative diagnosis: Carpal tunnel syndrome [G56.00]  Post-operative diagnosis Same as pre-operative diagnosis    Procedure: Endoscopic release carpal tunnel, Bilateral - Wrist    Surgeon: Surgeons and Role:     * Stan Regalado MD - Primary     * Jose De Jesus Narayanan PA - Assisting  Anesthesia: MAC with Local   Estimated Blood Loss: None    Drains: None  Specimens: * No specimens in log *  Findings:   None.  Complications: None.  Implants: * No implants in log *

## 2024-08-30 ENCOUNTER — OFFICE VISIT (OUTPATIENT)
Dept: ORTHOPEDICS | Facility: OTHER | Age: 59
End: 2024-08-30
Attending: SPECIALIST
Payer: COMMERCIAL

## 2024-08-30 ENCOUNTER — LAB (OUTPATIENT)
Dept: LAB | Facility: OTHER | Age: 59
End: 2024-08-30
Attending: FAMILY MEDICINE
Payer: COMMERCIAL

## 2024-08-30 DIAGNOSIS — R74.8 ELEVATED LIVER ENZYMES: ICD-10-CM

## 2024-08-30 DIAGNOSIS — G56.03 BILATERAL CARPAL TUNNEL SYNDROME: Primary | ICD-10-CM

## 2024-08-30 LAB
ALBUMIN SERPL BCG-MCNC: 4.4 G/DL (ref 3.5–5.2)
ALP SERPL-CCNC: 153 U/L (ref 40–150)
ALT SERPL W P-5'-P-CCNC: 13 U/L (ref 0–50)
AST SERPL W P-5'-P-CCNC: 17 U/L (ref 0–45)
BILIRUB DIRECT SERPL-MCNC: <0.2 MG/DL (ref 0–0.3)
BILIRUB SERPL-MCNC: 0.4 MG/DL
HOLD SPECIMEN: NORMAL
PROT SERPL-MCNC: 7.7 G/DL (ref 6.4–8.3)

## 2024-08-30 PROCEDURE — 36415 COLL VENOUS BLD VENIPUNCTURE: CPT | Mod: ZL

## 2024-08-30 PROCEDURE — G0463 HOSPITAL OUTPT CLINIC VISIT: HCPCS

## 2024-08-30 PROCEDURE — 99024 POSTOP FOLLOW-UP VISIT: CPT | Performed by: SPECIALIST

## 2024-08-30 PROCEDURE — 84460 ALANINE AMINO (ALT) (SGPT): CPT | Mod: ZL

## 2024-08-30 NOTE — PROGRESS NOTES
Subjective:    Patient returns for follow-up status post bilateral endoscopic carpal tunnel release 1 week out doing well numbness is resolved    Objective:    Examination shows incision healing nicely.  The range of motion is full intrinsic function is intact.  Imaging:     No new imaging  Assessment:    1 week status post endoscopic carpal tunnel release bilaterally doing well    Plan:    [place] activities follow-up as needed.    Stan Regalado MD

## 2024-09-10 ENCOUNTER — HOSPITAL ENCOUNTER (OUTPATIENT)
Dept: ULTRASOUND IMAGING | Facility: OTHER | Age: 59
Discharge: HOME | End: 2024-09-10
Payer: COMMERCIAL

## 2024-09-10 DIAGNOSIS — R74.8 ELEVATED LIVER ENZYMES: ICD-10-CM

## 2024-09-10 PROCEDURE — 76705 ECHO EXAM OF ABDOMEN: CPT

## 2024-09-14 ENCOUNTER — MYC MEDICAL ADVICE (OUTPATIENT)
Dept: FAMILY MEDICINE | Facility: OTHER | Age: 59
End: 2024-09-14
Payer: COMMERCIAL

## 2024-09-14 DIAGNOSIS — E66.812 CLASS 2 OBESITY DUE TO EXCESS CALORIES WITHOUT SERIOUS COMORBIDITY WITH BODY MASS INDEX (BMI) OF 37.0 TO 37.9 IN ADULT: ICD-10-CM

## 2024-09-14 DIAGNOSIS — E66.09 CLASS 2 OBESITY DUE TO EXCESS CALORIES WITHOUT SERIOUS COMORBIDITY WITH BODY MASS INDEX (BMI) OF 37.0 TO 37.9 IN ADULT: ICD-10-CM

## 2024-09-17 RX ORDER — METFORMIN HYDROCHLORIDE 750 MG/1
750 TABLET, EXTENDED RELEASE ORAL 2 TIMES DAILY WITH MEALS
Qty: 180 TABLET | Refills: 3 | Status: SHIPPED | OUTPATIENT
Start: 2024-09-17

## 2024-09-17 NOTE — TELEPHONE ENCOUNTER
Pt was prescribed Ursodiol for gall stones but is concerned about weight gain on this medication. She is taking metformin and bupropion but says they are not working for weight loss.     Metformin 750 mg daily   Bupropion 300 mg daily    Recommend increase in Metformin to BID.   [place]'d up order.     Please remove Wegovy from med list.    Routing to provider to review and respond.  Ree Martin RN on 9/17/2024 at 7:49 AM

## 2024-09-26 ENCOUNTER — TRANSFERRED RECORDS (OUTPATIENT)
Dept: HEALTH INFORMATION MANAGEMENT | Facility: OTHER | Age: 59
End: 2024-09-26

## 2024-09-26 ENCOUNTER — OFFICE VISIT (OUTPATIENT)
Dept: FAMILY MEDICINE | Facility: OTHER | Age: 59
End: 2024-09-26
Attending: STUDENT IN AN ORGANIZED HEALTH CARE EDUCATION/TRAINING PROGRAM
Payer: COMMERCIAL

## 2024-09-26 VITALS
WEIGHT: 215.25 LBS | OXYGEN SATURATION: 98 % | DIASTOLIC BLOOD PRESSURE: 84 MMHG | SYSTOLIC BLOOD PRESSURE: 136 MMHG | HEIGHT: 67 IN | HEART RATE: 89 BPM | RESPIRATION RATE: 20 BRPM | BODY MASS INDEX: 33.79 KG/M2 | TEMPERATURE: 96.9 F

## 2024-09-26 DIAGNOSIS — L01.00 IMPETIGO: Primary | ICD-10-CM

## 2024-09-26 PROCEDURE — G0463 HOSPITAL OUTPT CLINIC VISIT: HCPCS

## 2024-09-26 RX ORDER — MUPIROCIN 20 MG/G
OINTMENT TOPICAL 3 TIMES DAILY
Qty: 30 G | Refills: 0 | Status: SHIPPED | OUTPATIENT
Start: 2024-09-26 | End: 2024-10-03

## 2024-09-26 RX ORDER — BUDESONIDE 0.5 MG/2ML
INHALANT ORAL
COMMUNITY
Start: 2024-09-09

## 2024-09-26 ASSESSMENT — PAIN SCALES - GENERAL: PAINLEVEL: SEVERE PAIN (6)

## 2024-09-26 NOTE — NURSING NOTE
"Chief Complaint   Patient presents with    Derm Problem     Open sores bilateral inner nostrils x 3 month        Initial /84 (BP Location: Right arm, Patient Position: Sitting, Cuff Size: Adult Large)   Pulse 89   Temp 96.9  F (36.1  C) (Tympanic)   Resp 20   Ht 1.695 m (5' 6.75\")   Wt 97.6 kg (215 lb 4 oz)   LMP 10/09/2021 (Approximate)   SpO2 98%   BMI 33.97 kg/m   Estimated body mass index is 33.97 kg/m  as calculated from the following:    Height as of this encounter: 1.695 m (5' 6.75\").    Weight as of this encounter: 97.6 kg (215 lb 4 oz).  Medication Review: complete    The next two questions are to help us understand your food security.  If you are feeling you need any assistance in this area, we have resources available to support you today.          7/1/2024   SDOH- Food Insecurity   Within the past 12 months, did you worry that your food would run out before you got money to buy more? N   Within the past 12 months, did the food you bought just not last and you didn t have money to get more? N            Health Care Directive:  Patient has a Health Care Directive on file      Teresa Wu LPN      "

## 2024-09-26 NOTE — PROGRESS NOTES
"  Assessment & Plan   Problem List Items Addressed This Visit    None  Visit Diagnoses       Impetigo    -  Primary    Relevant Medications    budesonide (PULMICORT) 0.5 MG/2ML neb solution    mupirocin (BACTROBAN) 2 % external ointment           Based on location and symptoms and appearance of lesion, concerning for impetigo. Rx for bactroban, advised to use a clean q tip each time, get new allergy nasal sprays. Return if new or worsening symptoms and consider a bacterial swab          BMI  Estimated body mass index is 33.97 kg/m  as calculated from the following:    Height as of this encounter: 1.695 m (5' 6.75\").    Weight as of this encounter: 97.6 kg (215 lb 4 oz).           No follow-ups on file.      Franky Spencer is a 59 year old, presenting for the following health issues:  Derm Problem (Open sores bilateral inner nostrils x 3 month )    History of Present Illness       Reason for visit:  Génesis had sores in my nose for 3 months   She is taking medications regularly.     Nasal sores  - bilateral  - started 3 months ago   - has been trying multiple different nose sprays and allergy meds   - tried neosporin.   - on going               Review of Systems  Constitutional, HEENT, cardiovascular, pulmonary, gi and gu systems are negative, except as otherwise noted.      Objective    /84 (BP Location: Right arm, Patient Position: Sitting, Cuff Size: Adult Large)   Pulse 89   Temp 96.9  F (36.1  C) (Tympanic)   Resp 20   Ht 1.695 m (5' 6.75\")   Wt 97.6 kg (215 lb 4 oz)   LMP 10/09/2021 (Approximate)   SpO2 98%   BMI 33.97 kg/m    Body mass index is 33.97 kg/m .  Physical Exam   GENERAL: alert and no distress  HENT: right nostril erythematous, left nostril with small sore with overlying oozing and yellow crusting   SKIN: no suspicious lesions or rashes            Signed Electronically by: Jere Ye MD    "

## 2024-10-07 ENCOUNTER — TELEPHONE (OUTPATIENT)
Dept: FAMILY MEDICINE | Facility: OTHER | Age: 59
End: 2024-10-07
Payer: COMMERCIAL

## 2024-10-07 DIAGNOSIS — C44.320 SQUAMOUS CELL CARCINOMA OF SKIN OF FACE: Primary | ICD-10-CM

## 2024-10-07 NOTE — TELEPHONE ENCOUNTER
Reason for call: Request for a referral.    Referral requested for what concern?  Dermatology    Have you already been seen by the specialty you need the referral for?  yes    [facility] [place]    Additional comments: Patient states she needs a new referral.    Preferred method for responding to this message: Telephone Call    Phone number patient can be reached at? Cell number on file:    Telephone Information:   Mobile [phone]       If we can't reach you directly, may we leave a detailed response at the number you provided? Yes    Noemi Willis on 10/7/2024 at 3:39 PM

## 2024-10-08 NOTE — TELEPHONE ENCOUNTER
Called patient to inform her of referral sent for dermatology.    Megan Sheets LPN on 10/8/2024 at 11:11 AM

## 2024-10-15 ENCOUNTER — E-VISIT (OUTPATIENT)
Dept: URGENT CARE | Facility: CLINIC | Age: 59
End: 2024-10-15
Payer: COMMERCIAL

## 2024-10-15 DIAGNOSIS — J01.90 ACUTE SINUSITIS WITH SYMPTOMS > 10 DAYS: Primary | ICD-10-CM

## 2024-10-15 PROCEDURE — 99421 OL DIG E/M SVC 5-10 MIN: CPT | Performed by: EMERGENCY MEDICINE

## 2024-10-15 NOTE — PATIENT INSTRUCTIONS
Acute Sinusitis: Care Instructions  Overview     Acute sinusitis is an inflammation of the mucous membranes inside the nose and sinuses. Sinuses are the hollow spaces in your skull around the eyes and nose. Acute sinusitis often follows a cold. Acute sinusitis causes thick, discolored mucus that drains from the nose or down the back of the throat. It also can cause pain and pressure in your head and face along with a stuffy or blocked nose.  In most cases, sinusitis gets better on its own in 1 to 2 weeks. But some mild symptoms may last for several weeks. Sometimes antibiotics are needed if there is a bacterial infection.  Follow-up care is a key part of your treatment and safety. Be sure to make and go to all appointments, and call your doctor if you are having problems. It's also a good idea to know your test results and keep a list of the medicines you take.  How can you care for yourself at home?  Use saline (saltwater) nasal washes. This can help keep your nasal passages open and wash out mucus and allergens.  You can buy saline nose washes at a grocery store or drugstore. Follow the instructions on the package.  You can make your own at home. Add 1 teaspoon of non-iodized salt and 1 teaspoon of baking soda to 2 cups of distilled or boiled and cooled water. Fill a squeeze bottle or a nasal cleansing pot (such as a neti pot) with the nasal wash. Then put the tip into your nostril, and lean over the sink. With your mouth open, gently squirt the liquid. Repeat on the other side.  Try a decongestant nasal spray like oxymetazoline (Afrin). Do not use it for more than 3 days in a row. Using it for more than 3 days can make your congestion worse.  If needed, take an over-the-counter pain medicine, such as acetaminophen (Tylenol), ibuprofen (Advil, Motrin), or naproxen (Aleve). Read and follow all instructions on the label.  If the doctor prescribed antibiotics, take them as directed. Do not stop taking them just  "because you feel better. You need to take the full course of antibiotics.  Be careful when taking over-the-counter cold or flu medicines and Tylenol at the same time. Many of these medicines have acetaminophen, which is Tylenol. Read the labels to make sure that you are not taking more than the recommended dose. Too much acetaminophen (Tylenol) can be harmful.  Try a steroid nasal spray. It may help with your symptoms.  Breathe warm, moist air. You can use a steamy shower, a hot bath, or a sink filled with hot water. Avoid cold, dry air. Using a humidifier in your home may help. Follow the directions for cleaning the machine.  When should you call for help?   Call your doctor now or seek immediate medical care if:    You have new or worse swelling, redness, or pain in your face or around one or both of your eyes.     You have double vision or a change in your vision.     You have a high fever.     You have a severe headache and a stiff neck.     You have mental changes, such as feeling confused or much less alert.   Watch closely for changes in your health, and be sure to contact your doctor if:    You are not getting better as expected.   Where can you learn more?  Go to https://www.[employer].net/patiented  Enter I933 in the search box to learn more about \"Acute Sinusitis: Care Instructions.\"  Current as of: September 27, 2023  Content Version: 14.2 2024 [facility] [employer].   Care instructions adapted under license by your healthcare professional. If you have questions about a medical condition or this instruction, always ask your healthcare professional. [employer] disclaims any warranty or liability for your use of this information.    Dear Johanna Cm     Based on your responses and diagnosis, I have prescribed augmentin to treat your symptoms. I have sent this to your pharmacy.?     It is also important to stay well hydrated, get lots of rest and take over-the-counter " decongestants,?tylenol?or ibuprofen if you?are able to?take those medications per your primary care provider to help relieve discomfort.?     It is important that you take?all of?your prescribed medication even if your symptoms are improving after a few doses.? Taking?all of?your medicine helps prevent the symptoms from returning.?     If your symptoms worsen, you develop severe headache, vomiting, high fever (>102), or are not improving in 7 days, please contact your primary care provider for an appointment or visit any of our convenient Walk-in Care or Urgent Care Centers to be seen which can be found on our website?here.?     Thanks again for choosing?us?as your health care partner,?   ?  Jeronimo Castro MD?   Thank you for choosing us for your care. I have placed an order for a prescription so that you can start treatment. View your full visit summary for details by clicking on the link below. Your pharmacist will able to address any questions you may have about the medication.     If you're not feeling better within 5-7 days, please schedule an appointment.  You can schedule an appointment right here in [facility], or call [phone]  If the visit is for the same symptoms as your eVisit, we'll refund the cost of your eVisit if seen within seven days.

## 2024-11-10 NOTE — PROGRESS NOTES
"    Assessment & Plan       ICD-10-CM    1. Class 1 obesity without serious comorbidity with body mass index (BMI) of 33.0 to 33.9 in adult, unspecified obesity type  E66.811 Comprehensive Metabolic Panel    Z68.33 buPROPion (WELLBUTRIN XL) 300 MG 24 hr tablet     Comprehensive Metabolic Panel      2. Anxiety-like symptoms  F41.9       3. Gallstones  K80.20 Comprehensive Metabolic Panel     US Abdomen Complete     Comprehensive Metabolic Panel          Patient has done well overall with weight loss.  She has having side effects from metformin and will decrease this to 500 mg twice daily.  Continue with weight watchers, continue with bupropion.  We discussed potential next steps if she continues to not tolerate Wellbutrin such as changing to naltrexone or topiramate.  Continue behavioral and lifestyle changes.  Recheck of alkaline phosphatase level today  Anxiety, stable  Follow-up gallbladder ultrasound 6 months post starting ursodiol.    PDMP Review         Value Time User    State PDMP site checked  Yes 8/14/2024  3:14 PM Eneida Zacarias, APRN CNP                       The longitudinal plan of care was addressed during this visit. Due to the added complexity in care, I will continue to support Johanna CRYSTAL Cm in the subsequent management of this condition(s) and with the ongoing continuity of care of this condition(s).        BMI  Estimated body mass index is 33.45 kg/m  as calculated from the following:    Height as of this encounter: 1.695 m (5' 6.75\").    Weight as of this encounter: 96.2 kg (212 lb).   See above       Return in about 4 months (around 3/11/2025).    MARIAJOSE LARA MD  [facility]    Subjective   Johannamaria luisa Cm is a 59 year old female  presenting for the following health issues: Nursing Notes:   Megan Sheets LPN  11/11/2024  8:48 AM  Signed  Chief Complaint   Patient presents with    RECHECK     F/U - weight loss       Initial /80 (BP Location: Right " "arm, Patient Position: Sitting, Cuff Size: Adult Regular)   Pulse 77   Temp 96.8  F (36  C) (Temporal)   Resp 14   Ht 1.695 m (5' 6.75\")   Wt 96.2 kg (212 lb)   LMP 10/09/2021 (Approximate)   SpO2 99%   BMI 33.45 kg/m   Estimated body mass index is 33.45 kg/m  as calculated from the following:    Height as of this encounter: 1.695 m (5' 6.75\").    Weight as of this encounter: 96.2 kg (212 lb).    Medication Review: complete    The next two questions are to help us understand your food security.  If you are feeling you need any assistance in this area, we have resources available to support you today.          7/1/2024   SDOH- Food Insecurity   Within the past 12 months, did you worry that your food would run out before you got money to buy more? N    Within the past 12 months, did the food you bought just not last and you didn t have money to get more? N        Patient-reported     Health Care Directive:  Patient has a Health Care Directive on file      Megan Sheets, NIRAJ                                 HPI Johanna Cm is a 59 year old female presents for follow up of obesity/wt loss management and anxiety symptoms.    Current medications - metformin and Wellbutrin for wt management.  Wellbutrin also for mood symptoms.    GLP 1 not covered.    About once a week she gets abdomen pain and diarrhea.  She uses [employer] online and tracks her wt.  She isn't hungry like she used to be.      Gallstones - or ursodiol ; elevated alkaline phos      Sinus infection last month - needed antibiotics after having used NetiPot, last was 1.5 years ago        Wt loss management:  Wt Readings from Last 8 Encounters:   11/11/24 96.2 kg (212 lb)   09/26/24 97.6 kg (215 lb 4 oz)   08/23/24 98 kg (216 lb)   08/14/24 98.3 kg (216 lb 12.8 oz)   07/01/24 97.8 kg (215 lb 9.6 oz)   05/14/24 100.2 kg (220 lb 12.8 oz)   04/23/24 101.2 kg (223 lb 3.2 oz)   03/29/24 100.7 kg (222 lb)           Current Outpatient Medications   Medication Sig " Dispense Refill    acyclovir (ZOVIRAX) 400 MG tablet Take 1 tablet (400 mg) by mouth daily 90 tablet 3    budesonide (PULMICORT) 0.5 MG/2ML neb solution Squirt entire vial into [facility]ford med saline solution, mix, and irrigate each nostril until entire bottle empty. Do this twice daily.      buPROPion (WELLBUTRIN XL) 300 MG 24 hr tablet Take 1 tablet (300 mg) by mouth every morning. 90 tablet 3    clotrimazole-betamethasone (LOTRISONE) 1-0.05 % external cream apply TO affected area two TO three TIMES DAILY 45 g 1    fluticasone (FLONASE) 50 MCG/ACT nasal spray Spray 2 sprays into both nostrils daily 16 g 11    hydrocortisone 2.5 % cream Apply to affected areas twice daily until clear then use as needed for flares. Mix with equal parts of ketoconazole.      ketoconazole (NIZORAL) 2 % external cream Apply to affected areas twice a day until clear then use as needed for flares. Mix with equal parts of hydrocortisone.      metFORMIN (GLUMETZA) 500 MG 24 hr tablet Take 1 tablet (500 mg) by mouth 2 times daily (with meals). 180 tablet 4    omeprazole (PRILOSEC) 10 MG DR capsule Take 2 capsules (20 mg) by mouth daily 60 capsule 5    ursodiol (ACTIGALL) 300 MG capsule Take 1 capsule (300 mg) by mouth 2 times daily. 180 capsule 0     No current facility-administered medications for this visit.     Past Medical History:   Diagnosis Date    Epilepsy (H)     off meds since age 14    History of skin cancer 03/2024    SCC calf    Obesity     Primary osteoarthritis of both knees                Review of Systems     Occupational therapy for her wrists after CTS   She had some nasal sores - healed up       11/21/2022    10:25 AM 5/14/2024    10:10 AM   PHQ   PHQ-9 Total Score 0 0   Q9: Thoughts of better off dead/self-harm past 2 weeks Not at all  Not at all       Patient-reported         11/21/2022    10:26 AM 6/26/2024    10:58 AM   SHANNON-7 SCORE   Total Score 0 (minimal anxiety) 0 (minimal anxiety)   Total Score 0 0     Lab Results  "  Component Value Date    A1C 5.1 07/14/2023             Objective  /80 (BP Location: Right arm, Patient Position: Sitting, Cuff Size: Adult Regular)   Pulse 77   Temp 96.8  F (36  C) (Temporal)   Resp 14   Ht 1.695 m (5' 6.75\")   Wt 96.2 kg (212 lb)   LMP 10/09/2021 (Approximate)   SpO2 99%   BMI 33.45 kg/m     Physical Exam   GENERAL: alert and no distress    Lab on 08/30/2024   Component Date Value Ref Range Status    Protein Total 08/30/2024 7.7  6.4 - 8.3 g/dL Final    Albumin 08/30/2024 4.4  3.5 - 5.2 g/dL Final    Bilirubin Total 08/30/2024 0.4  <=1.2 mg/dL Final    Alkaline Phosphatase 08/30/2024 153 (H)  40 - 150 U/L Final    AST 08/30/2024 17  0 - 45 U/L Final    ALT 08/30/2024 13  0 - 50 U/L Final    Bilirubin Direct 08/30/2024 <0.20  0.00 - 0.30 mg/dL Final    Hold Specimen 08/30/2024 [facility]   Final             Answers submitted by the patient for this visit:  General Questionnaire (Submitted on 11/7/2024)  Chief Complaint: Chronic problems general questions HPI Form  What is the reason for your visit today? : Check up on weight loss and blood work done  How many days per week do you miss taking your medication?: 0  Questionnaire about: Chronic problems general questions HPI Form (Submitted on 11/7/2024)  Chief Complaint: Chronic problems general questions HPI Form    "

## 2024-11-11 ENCOUNTER — OFFICE VISIT (OUTPATIENT)
Dept: FAMILY MEDICINE | Facility: OTHER | Age: 59
End: 2024-11-11
Attending: FAMILY MEDICINE
Payer: COMMERCIAL

## 2024-11-11 VITALS
SYSTOLIC BLOOD PRESSURE: 130 MMHG | BODY MASS INDEX: 33.27 KG/M2 | DIASTOLIC BLOOD PRESSURE: 80 MMHG | OXYGEN SATURATION: 99 % | RESPIRATION RATE: 14 BRPM | TEMPERATURE: 96.8 F | WEIGHT: 212 LBS | HEIGHT: 67 IN | HEART RATE: 77 BPM

## 2024-11-11 DIAGNOSIS — E66.811 CLASS 1 OBESITY WITHOUT SERIOUS COMORBIDITY WITH BODY MASS INDEX (BMI) OF 33.0 TO 33.9 IN ADULT, UNSPECIFIED OBESITY TYPE: Primary | ICD-10-CM

## 2024-11-11 DIAGNOSIS — K80.20 GALLSTONES: ICD-10-CM

## 2024-11-11 DIAGNOSIS — F41.9 ANXIETY-LIKE SYMPTOMS: ICD-10-CM

## 2024-11-11 LAB
ALBUMIN SERPL BCG-MCNC: 4.6 G/DL (ref 3.5–5.2)
ALP SERPL-CCNC: 164 U/L (ref 40–150)
ALT SERPL W P-5'-P-CCNC: 18 U/L (ref 0–50)
ANION GAP SERPL CALCULATED.3IONS-SCNC: 10 MMOL/L (ref 7–15)
AST SERPL W P-5'-P-CCNC: 19 U/L (ref 0–45)
BILIRUB SERPL-MCNC: 0.3 MG/DL
BUN SERPL-MCNC: 10.7 MG/DL (ref 6–20)
CALCIUM SERPL-MCNC: 9.6 MG/DL (ref 8.8–10.4)
CHLORIDE SERPL-SCNC: 102 MMOL/L (ref 98–107)
CREAT SERPL-MCNC: 0.72 MG/DL (ref 0.51–0.95)
EGFRCR SERPLBLD CKD-EPI 2021: >90 ML/MIN/1.73M2
GLUCOSE SERPL-MCNC: 89 MG/DL (ref 70–99)
HCO3 SERPL-SCNC: 28 MMOL/L (ref 22–29)
POTASSIUM SERPL-SCNC: 4.1 MMOL/L (ref 3.4–5.3)
PROT SERPL-MCNC: 7.4 G/DL (ref 6.4–8.3)
SODIUM SERPL-SCNC: 140 MMOL/L (ref 135–145)

## 2024-11-11 PROCEDURE — 99213 OFFICE O/P EST LOW 20 MIN: CPT | Mod: 24 | Performed by: FAMILY MEDICINE

## 2024-11-11 PROCEDURE — G2211 COMPLEX E/M VISIT ADD ON: HCPCS | Performed by: FAMILY MEDICINE

## 2024-11-11 PROCEDURE — 84155 ASSAY OF PROTEIN SERUM: CPT | Mod: ZL | Performed by: FAMILY MEDICINE

## 2024-11-11 PROCEDURE — G0463 HOSPITAL OUTPT CLINIC VISIT: HCPCS

## 2024-11-11 PROCEDURE — 36415 COLL VENOUS BLD VENIPUNCTURE: CPT | Mod: ZL | Performed by: FAMILY MEDICINE

## 2024-11-11 RX ORDER — HYDROCORTISONE 25 MG/G
CREAM TOPICAL
COMMUNITY
Start: 2024-10-07

## 2024-11-11 RX ORDER — BUPROPION HYDROCHLORIDE 300 MG/1
300 TABLET ORAL EVERY MORNING
Qty: 90 TABLET | Refills: 3 | Status: SHIPPED | OUTPATIENT
Start: 2024-11-11

## 2024-11-11 RX ORDER — KETOCONAZOLE 20 MG/G
CREAM TOPICAL
COMMUNITY
Start: 2024-10-07

## 2024-11-11 RX ORDER — METFORMIN HYDROCHLORIDE 500 MG/1
500 TABLET, FILM COATED, EXTENDED RELEASE ORAL 2 TIMES DAILY WITH MEALS
Qty: 180 TABLET | Refills: 4 | Status: SHIPPED | OUTPATIENT
Start: 2024-11-11

## 2024-11-11 ASSESSMENT — PAIN SCALES - GENERAL: PAINLEVEL_OUTOF10: NO PAIN (0)

## 2024-11-11 NOTE — PATIENT INSTRUCTIONS
"If diarrhea continues with metformin and we need to stop it:  Considerations:  1.  The other \"half\" of contrave is naltrexone  2.   Topamax  (this is an anti-migraine/antiseizure medication)    "

## 2024-11-11 NOTE — NURSING NOTE
"Chief Complaint   Patient presents with    RECHECK     F/U - weight loss       Initial /80 (BP Location: Right arm, Patient Position: Sitting, Cuff Size: Adult Regular)   Pulse 77   Temp 96.8  F (36  C) (Temporal)   Resp 14   Ht 1.695 m (5' 6.75\")   Wt 96.2 kg (212 lb)   LMP 10/09/2021 (Approximate)   SpO2 99%   BMI 33.45 kg/m   Estimated body mass index is 33.45 kg/m  as calculated from the following:    Height as of this encounter: 1.695 m (5' 6.75\").    Weight as of this encounter: 96.2 kg (212 lb).    Medication Review: complete    The next two questions are to help us understand your food security.  If you are feeling you need any assistance in this area, we have resources available to support you today.          7/1/2024   SDOH- Food Insecurity   Within the past 12 months, did you worry that your food would run out before you got money to buy more? N    Within the past 12 months, did the food you bought just not last and you didn t have money to get more? N        Patient-reported     Health Care Directive:  Patient has a Health Care Directive on file      Megan Sheets LPN      "

## 2024-11-25 ENCOUNTER — TRANSFERRED RECORDS (OUTPATIENT)
Dept: HEALTH INFORMATION MANAGEMENT | Facility: OTHER | Age: 59
End: 2024-11-25
Payer: COMMERCIAL

## 2024-11-29 DIAGNOSIS — K80.20 CALCULUS OF GALLBLADDER WITHOUT CHOLECYSTITIS WITHOUT OBSTRUCTION: ICD-10-CM

## 2024-12-02 RX ORDER — URSODIOL 300 MG/1
300 CAPSULE ORAL 2 TIMES DAILY
Qty: 180 CAPSULE | Refills: 1 | Status: SHIPPED | OUTPATIENT
Start: 2024-12-02

## 2024-12-14 ENCOUNTER — OFFICE VISIT (OUTPATIENT)
Dept: FAMILY MEDICINE | Facility: OTHER | Age: 59
End: 2024-12-14
Payer: COMMERCIAL

## 2024-12-14 VITALS
DIASTOLIC BLOOD PRESSURE: 80 MMHG | TEMPERATURE: 97.8 F | RESPIRATION RATE: 14 BRPM | HEIGHT: 67 IN | OXYGEN SATURATION: 99 % | SYSTOLIC BLOOD PRESSURE: 130 MMHG | WEIGHT: 210.8 LBS | BODY MASS INDEX: 33.09 KG/M2 | HEART RATE: 90 BPM

## 2024-12-14 DIAGNOSIS — R05.1 ACUTE COUGH: ICD-10-CM

## 2024-12-14 DIAGNOSIS — J02.9 SORE THROAT: ICD-10-CM

## 2024-12-14 DIAGNOSIS — J01.90 ACUTE BACTERIAL SINUSITIS: Primary | ICD-10-CM

## 2024-12-14 DIAGNOSIS — B96.89 ACUTE BACTERIAL SINUSITIS: Primary | ICD-10-CM

## 2024-12-14 LAB
FLUAV RNA SPEC QL NAA+PROBE: NEGATIVE
FLUBV RNA RESP QL NAA+PROBE: NEGATIVE
RSV RNA SPEC NAA+PROBE: NEGATIVE
S PYO DNA THROAT QL NAA+PROBE: NOT DETECTED
SARS-COV-2 RNA RESP QL NAA+PROBE: NEGATIVE

## 2024-12-14 PROCEDURE — G0463 HOSPITAL OUTPT CLINIC VISIT: HCPCS

## 2024-12-14 PROCEDURE — 87651 STREP A DNA AMP PROBE: CPT | Mod: ZL

## 2024-12-14 PROCEDURE — 87637 SARSCOV2&INF A&B&RSV AMP PRB: CPT | Mod: ZL

## 2024-12-14 RX ORDER — METFORMIN HYDROCHLORIDE 500 MG/1
500 TABLET, EXTENDED RELEASE ORAL 2 TIMES DAILY WITH MEALS
COMMUNITY
Start: 2024-11-11

## 2024-12-14 ASSESSMENT — ENCOUNTER SYMPTOMS
SINUS PAIN: 1
CHEST TIGHTNESS: 1
DIARRHEA: 0
CHILLS: 1
FEVER: 1
SORE THROAT: 1
EYES NEGATIVE: 1
SINUS PRESSURE: 1
CARDIOVASCULAR NEGATIVE: 1
COUGH: 1
VOMITING: 0
MUSCULOSKELETAL NEGATIVE: 1
NAUSEA: 0

## 2024-12-14 ASSESSMENT — PAIN SCALES - GENERAL: PAINLEVEL_OUTOF10: MODERATE PAIN (5)

## 2024-12-14 NOTE — NURSING NOTE
"Chief Complaint   Patient presents with    Sinus Problem     Possible sinus infection, sore throat, cough, chest congestion       Initial /80 (BP Location: Right arm, Patient Position: Sitting, Cuff Size: Adult Regular)   Pulse 90   Temp 97.8  F (36.6  C) (Temporal)   Resp 14   Ht 1.695 m (5' 6.75\")   Wt 95.6 kg (210 lb 12.8 oz)   LMP 10/09/2021 (Approximate)   SpO2 99%   Breastfeeding No   BMI 33.26 kg/m   Estimated body mass index is 33.26 kg/m  as calculated from the following:    Height as of this encounter: 1.695 m (5' 6.75\").    Weight as of this encounter: 95.6 kg (210 lb 12.8 oz).  Medication Review: complete    The next two questions are to help us understand your food security.  If you are feeling you need any assistance in this area, we have resources available to support you today.          7/1/2024   SDOH- Food Insecurity   Within the past 12 months, did you worry that your food would run out before you got money to buy more? N    Within the past 12 months, did the food you bought just not last and you didn t have money to get more? N        Patient-reported       Health Care Directive:  Patient has a Health Care Directive on file      Megan Sheets LPN      "

## 2024-12-14 NOTE — PATIENT INSTRUCTIONS

## 2025-01-06 DIAGNOSIS — H69.93 UNSPECIFIED EUSTACHIAN TUBE DISORDER, BILATERAL: ICD-10-CM

## 2025-01-07 NOTE — TELEPHONE ENCOUNTER
Pulmicort      Last Written Prescription Date:  9.9.24  Last Fill Quantity: #200mL,   # refills: 0  Last Office Visit: 10.19.23  Future Office visit:    Next 5 appointments (look out 90 days)      Mar 11, 2025 8:40 AM  (Arrive by 8:25 AM)  Provider Visit with Sera Franklin MD  [facility] ([facility] [address]  [facility] [address]  [phone]             Routing refill request to provider for review/approval because:

## 2025-01-08 RX ORDER — BUDESONIDE 0.5 MG/2ML
INHALANT ORAL
Qty: 200 ML | Refills: 0 | Status: SHIPPED | OUTPATIENT
Start: 2025-01-08

## 2025-01-27 ENCOUNTER — TELEPHONE (OUTPATIENT)
Dept: FAMILY MEDICINE | Facility: OTHER | Age: 60
End: 2025-01-27
Payer: COMMERCIAL

## 2025-01-27 DIAGNOSIS — E66.09 CLASS 2 OBESITY DUE TO EXCESS CALORIES WITHOUT SERIOUS COMORBIDITY WITH BODY MASS INDEX (BMI) OF 37.0 TO 37.9 IN ADULT: Primary | ICD-10-CM

## 2025-01-27 DIAGNOSIS — E66.812 CLASS 2 OBESITY DUE TO EXCESS CALORIES WITHOUT SERIOUS COMORBIDITY WITH BODY MASS INDEX (BMI) OF 37.0 TO 37.9 IN ADULT: Primary | ICD-10-CM

## 2025-01-27 RX ORDER — METFORMIN HYDROCHLORIDE 500 MG/1
500 TABLET, EXTENDED RELEASE ORAL 2 TIMES DAILY WITH MEALS
Qty: 180 TABLET | Refills: 2 | Status: SHIPPED | OUTPATIENT
Start: 2025-01-27

## 2025-01-27 NOTE — TELEPHONE ENCOUNTER
Per therapeutic substitution protocol:     Medication: Glutmetza is not covered by pt's insurance. Pt has been taking regular metformin  mg tablets. Re-sending Rx for generic metformin ER vs Glutmetza per [facility].     Updated medication list in EPIC.    Abdiaziz Sharp, PharmD  [facility]

## 2025-03-03 ENCOUNTER — HOSPITAL ENCOUNTER (OUTPATIENT)
Dept: ULTRASOUND IMAGING | Facility: OTHER | Age: 60
Discharge: HOME | End: 2025-03-03
Attending: FAMILY MEDICINE | Admitting: FAMILY MEDICINE
Payer: COMMERCIAL

## 2025-03-03 DIAGNOSIS — K80.20 GALLSTONES: ICD-10-CM

## 2025-03-03 PROCEDURE — 76705 ECHO EXAM OF ABDOMEN: CPT

## 2025-03-10 ASSESSMENT — ANXIETY QUESTIONNAIRES
5. BEING SO RESTLESS THAT IT IS HARD TO SIT STILL: NOT AT ALL
GAD7 TOTAL SCORE: 0
3. WORRYING TOO MUCH ABOUT DIFFERENT THINGS: NOT AT ALL
6. BECOMING EASILY ANNOYED OR IRRITABLE: NOT AT ALL
1. FEELING NERVOUS, ANXIOUS, OR ON EDGE: NOT AT ALL
8. IF YOU CHECKED OFF ANY PROBLEMS, HOW DIFFICULT HAVE THESE MADE IT FOR YOU TO DO YOUR WORK, TAKE CARE OF THINGS AT HOME, OR GET ALONG WITH OTHER PEOPLE?: NOT DIFFICULT AT ALL
GAD7 TOTAL SCORE: 0
GAD7 TOTAL SCORE: 0
2. NOT BEING ABLE TO STOP OR CONTROL WORRYING: NOT AT ALL
IF YOU CHECKED OFF ANY PROBLEMS ON THIS QUESTIONNAIRE, HOW DIFFICULT HAVE THESE PROBLEMS MADE IT FOR YOU TO DO YOUR WORK, TAKE CARE OF THINGS AT HOME, OR GET ALONG WITH OTHER PEOPLE: NOT DIFFICULT AT ALL
7. FEELING AFRAID AS IF SOMETHING AWFUL MIGHT HAPPEN: NOT AT ALL
7. FEELING AFRAID AS IF SOMETHING AWFUL MIGHT HAPPEN: NOT AT ALL
4. TROUBLE RELAXING: NOT AT ALL

## 2025-03-11 ENCOUNTER — OFFICE VISIT (OUTPATIENT)
Dept: FAMILY MEDICINE | Facility: OTHER | Age: 60
End: 2025-03-11
Attending: FAMILY MEDICINE
Payer: COMMERCIAL

## 2025-03-11 VITALS
RESPIRATION RATE: 14 BRPM | SYSTOLIC BLOOD PRESSURE: 126 MMHG | HEIGHT: 67 IN | OXYGEN SATURATION: 98 % | HEART RATE: 78 BPM | WEIGHT: 208 LBS | BODY MASS INDEX: 32.65 KG/M2 | DIASTOLIC BLOOD PRESSURE: 80 MMHG | TEMPERATURE: 97.2 F

## 2025-03-11 DIAGNOSIS — K80.20 CALCULUS OF GALLBLADDER WITHOUT CHOLECYSTITIS WITHOUT OBSTRUCTION: Primary | ICD-10-CM

## 2025-03-11 DIAGNOSIS — E66.811 CLASS 1 OBESITY WITHOUT SERIOUS COMORBIDITY WITH BODY MASS INDEX (BMI) OF 32.0 TO 32.9 IN ADULT, UNSPECIFIED OBESITY TYPE: ICD-10-CM

## 2025-03-11 DIAGNOSIS — B00.1 RECURRENT COLD SORES: ICD-10-CM

## 2025-03-11 DIAGNOSIS — F41.9 ANXIETY-LIKE SYMPTOMS: ICD-10-CM

## 2025-03-11 LAB
EST. AVERAGE GLUCOSE BLD GHB EST-MCNC: 94 MG/DL
HBA1C MFR BLD: 4.9 %

## 2025-03-11 PROCEDURE — G0463 HOSPITAL OUTPT CLINIC VISIT: HCPCS

## 2025-03-11 PROCEDURE — 83036 HEMOGLOBIN GLYCOSYLATED A1C: CPT | Mod: ZL | Performed by: FAMILY MEDICINE

## 2025-03-11 PROCEDURE — 36415 COLL VENOUS BLD VENIPUNCTURE: CPT | Mod: ZL | Performed by: FAMILY MEDICINE

## 2025-03-11 RX ORDER — ACYCLOVIR 400 MG/1
400 TABLET ORAL DAILY
Qty: 90 TABLET | Refills: 3 | Status: SHIPPED | OUTPATIENT
Start: 2025-03-11

## 2025-03-11 RX ORDER — CEPHALEXIN 500 MG/1
CAPSULE ORAL
COMMUNITY
Start: 2024-04-09 | End: 2025-03-11

## 2025-03-11 ASSESSMENT — PAIN SCALES - GENERAL: PAINLEVEL_OUTOF10: MILD PAIN (1)

## 2025-03-11 NOTE — NURSING NOTE
"Chief Complaint   Patient presents with    Recheck Medication     Weight loss medication, anxiety       Initial /80 (BP Location: Right arm, Patient Position: Sitting, Cuff Size: Adult Regular)   Pulse 78   Temp 97.2  F (36.2  C) (Temporal)   Resp 14   Ht 1.695 m (5' 6.75\")   Wt 94.3 kg (208 lb)   LMP 10/09/2021 (Approximate)   SpO2 98%   Breastfeeding No   BMI 32.82 kg/m   Estimated body mass index is 32.82 kg/m  as calculated from the following:    Height as of this encounter: 1.695 m (5' 6.75\").    Weight as of this encounter: 94.3 kg (208 lb).    Medication Review: complete    The next two questions are to help us understand your food security.  If you are feeling you need any assistance in this area, we have resources available to support you today.          7/1/2024   SDOH- Food Insecurity   Within the past 12 months, did you worry that your food would run out before you got money to buy more? N    Within the past 12 months, did the food you bought just not last and you didn t have money to get more? N        Proxy-reported       Health Care Directive:  Patient has a Health Care Directive on file      Megan Sheets LPN      "

## 2025-03-11 NOTE — PROGRESS NOTES
"    Assessment & Plan       ICD-10-CM    1. Calculus of gallbladder without cholecystitis without obstruction  K80.20 US Abdomen Limited      2. Recurrent cold sores  B00.1 acyclovir (ZOVIRAX) 400 MG tablet      3. Anxiety-like symptoms  F41.9       4. Class 1 obesity without serious comorbidity with body mass index (BMI) of 32.0 to 32.9 in adult, unspecified obesity type  E66.811     Z68.32         Reviewed repeated gallbladder U/S from 3/3/25. No major changes, persistent cholelithiasis. Pt has been taking Ursodiol since November. Ordered another U/S in 6 months for monitoring. Of note, if partial stone dissolution is not observed within 12 months, the likelihood of success is greatly reduced. If gallstones appear to have dissolved, plan to continue therapy until dissolution is confirmed by repeat ultrasound within 3 months.   Refill Acyclovir today.  Anxiety - stable.  Patient continues to do well with weight loss, reasonable goal setting, and maintaining physical activity. Congratulated patient on her efforts. Doing well with Wellbutrin concerning food cravings/urges, and good improvement in mood. Plan to continue this today. Recheck A1c today to see if we would like to continue the decreased Metformin dose of 500 mg BID.     PDMP Review         Value Time User    State PDMP site checked  Yes 8/14/2024  3:14 PM Eneida Zacarias APRN CNP             The longitudinal plan of care was addressed during this visit. Due to the added complexity in care, I will continue to support Johanna Cm in the subsequent management of this condition(s) and with the ongoing continuity of care of this condition(s).        BMI  Estimated body mass index is 32.82 kg/m  as calculated from the following:    Height as of this encounter: 1.695 m (5' 6.75\").    Weight as of this encounter: 94.3 kg (208 lb).   Weight management plan: Discussed healthy diet and exercise guidelines    Follow up in 3 months.    MARIAJOSE LARA, " "MD [facility]    Subjective   Johanna Cm is a 59 year old female presenting for the following health issues: medication evaluation and weight management follow up.                           HPI Johanna Cm is a 59 year old female presents for medication evaluation.     No gallbladder issues - repeat U/S completed 3/3 that showed a 2 cm stone. No evidence of cholecystitis. Pt reports no gallbladder \"attacks\" since last visit. Denies any SE from Ursodiol.    Wt management. Wt since November has decreased by #2-3 pt states she has lost #13.5 lbs since last year. She has been doing 30 min elliptical every day. Decreased Metformin dose to 500 mg BID in November d/t GI side effects. She states today that her GI symptoms persisted and she tried probiotic which was helpful. She states her food urges/cravings have lessened since starting Wellbutrin. Pt also notes an improvement in anxiety/mood.    Lumbar disc degeneration and spinal arthritis. States she has difficulty getting her pants on. She has been taking Tylenol and Advil at the same time per chiropractic recommendation. Pain is aggravated by sitting and laying down. Pain relieves with activity.       Current Outpatient Medications   Medication Sig Dispense Refill    acyclovir (ZOVIRAX) 400 MG tablet Take 1 tablet (400 mg) by mouth daily. 90 tablet 3    budesonide (PULMICORT) 0.5 MG/2ML neb solution Squirt entire vial into bradford med saline solution, mix, and irrigate each nostril until entire bottle empty. Do this twice daily. 200 mL 0    buPROPion (WELLBUTRIN XL) 300 MG 24 hr tablet Take 1 tablet (300 mg) by mouth every morning. 90 tablet 3    clotrimazole-betamethasone (LOTRISONE) 1-0.05 % external cream apply TO affected area two TO three TIMES DAILY 45 g 1    fluticasone (FLONASE) 50 MCG/ACT nasal spray Spray 2 sprays into both nostrils daily 16 g 11    hydrocortisone 2.5 % cream Apply to affected areas twice daily until clear then use " "as needed for flares. Mix with equal parts of ketoconazole.      ketoconazole (NIZORAL) 2 % external cream Apply to affected areas twice a day until clear then use as needed for flares. Mix with equal parts of hydrocortisone.      metFORMIN (GLUCOPHAGE XR) 500 MG 24 hr tablet Take 1 tablet (500 mg) by mouth 2 times daily (with meals). 180 tablet 2    omeprazole (PRILOSEC) 10 MG DR capsule Take 2 capsules (20 mg) by mouth daily 180 capsule 1    ursodiol (ACTIGALL) 300 MG capsule Take 1 capsule (300 mg) by mouth 2 times daily. 180 capsule 1     No current facility-administered medications for this visit.     Past Medical History:   Diagnosis Date    Epilepsy (H)     off meds since age 14    History of skin cancer 03/2024    SCC calf    Obesity     Primary osteoarthritis of both knees        Review of Systems   Constitutional, HEENT, cardiovascular, pulmonary, GI, , musculoskeletal, neuro, skin, endocrine and psych systems are negative, except as otherwise noted.        11/21/2022    10:25 AM 5/14/2024    10:10 AM   PHQ   PHQ-9 Total Score 0 0   Q9: Thoughts of better off dead/self-harm past 2 weeks Not at all Not at all         11/21/2022    10:26 AM 6/26/2024    10:58 AM 3/10/2025     6:21 PM   SHANNON-7 SCORE   Total Score 0 (minimal anxiety) 0 (minimal anxiety) 0 (minimal anxiety)   Total Score 0 0 0        Patient-reported         Objective  /80 (BP Location: Right arm, Patient Position: Sitting, Cuff Size: Adult Regular)   Pulse 78   Temp 97.2  F (36.2  C) (Temporal)   Resp 14   Ht 1.695 m (5' 6.75\")   Wt 94.3 kg (208 lb)   LMP 10/09/2021 (Approximate)   SpO2 98%   Breastfeeding No   BMI 32.82 kg/m     Wt Readings from Last 4 Encounters:   03/11/25 94.3 kg (208 lb)   12/14/24 95.6 kg (210 lb 12.8 oz)   11/11/24 96.2 kg (212 lb)   09/26/24 97.6 kg (215 lb 4 oz)       Physical Exam   GENERAL: alert and no distress  ABDOMEN: soft, nontender  MS: no gross musculoskeletal defects noted, no edema  PSYCH: " mentation appears normal, affect normal/bright    Labs pending. GB U/S in 6 months.         Answers submitted by the patient for this visit:  Patient Health Questionnaire (G7) (Submitted on 3/10/2025)  SHANNON 7 TOTAL SCORE: 0  General Questionnaire (Submitted on 3/6/2025)  Chief Complaint: Chronic problems general questions HPI Form  What is the reason for your visit today? : Talking about my gallbladder stone & weight loss  How many days per week do you miss taking your medication?: 0  Questionnaire about: Chronic problems general questions HPI Form (Submitted on 3/6/2025)  Chief Complaint: Chronic problems general questions HPI Form    Charline Love Student PA on 3/11/2025 at 10:11 AM      I was present with Charline Love Student PA  who participated in the service and in the documentation of this note.  I have verified the history and personally performed the physical exam medical decision making, and have verified the content of the note, which accurately reflects my assessment of the patient and the plan of care.  MARIAJOSE LARA MD on 3/11/2025

## 2025-05-05 ENCOUNTER — HOSPITAL ENCOUNTER (OUTPATIENT)
Dept: GENERAL RADIOLOGY | Facility: OTHER | Age: 60
Discharge: HOME | End: 2025-05-05
Attending: STUDENT IN AN ORGANIZED HEALTH CARE EDUCATION/TRAINING PROGRAM
Payer: COMMERCIAL

## 2025-05-05 ENCOUNTER — OFFICE VISIT (OUTPATIENT)
Dept: FAMILY MEDICINE | Facility: OTHER | Age: 60
End: 2025-05-05
Attending: STUDENT IN AN ORGANIZED HEALTH CARE EDUCATION/TRAINING PROGRAM
Payer: COMMERCIAL

## 2025-05-05 VITALS
BODY MASS INDEX: 33.74 KG/M2 | HEIGHT: 67 IN | SYSTOLIC BLOOD PRESSURE: 137 MMHG | WEIGHT: 215 LBS | OXYGEN SATURATION: 96 % | TEMPERATURE: 97.5 F | HEART RATE: 76 BPM | RESPIRATION RATE: 20 BRPM | DIASTOLIC BLOOD PRESSURE: 89 MMHG

## 2025-05-05 DIAGNOSIS — G89.29 CHRONIC MIDLINE LOW BACK PAIN WITHOUT SCIATICA: ICD-10-CM

## 2025-05-05 DIAGNOSIS — M54.50 CHRONIC MIDLINE LOW BACK PAIN WITHOUT SCIATICA: ICD-10-CM

## 2025-05-05 DIAGNOSIS — G89.29 CHRONIC MIDLINE LOW BACK PAIN WITHOUT SCIATICA: Primary | ICD-10-CM

## 2025-05-05 DIAGNOSIS — M54.50 CHRONIC MIDLINE LOW BACK PAIN WITHOUT SCIATICA: Primary | ICD-10-CM

## 2025-05-05 DIAGNOSIS — F40.240 CLAUSTROPHOBIA: ICD-10-CM

## 2025-05-05 PROCEDURE — 72100 X-RAY EXAM L-S SPINE 2/3 VWS: CPT | Mod: 26 | Performed by: RADIOLOGY

## 2025-05-05 PROCEDURE — 72100 X-RAY EXAM L-S SPINE 2/3 VWS: CPT

## 2025-05-05 PROCEDURE — 72200 X-RAY EXAM SI JOINTS: CPT | Mod: 26 | Performed by: RADIOLOGY

## 2025-05-05 PROCEDURE — G0463 HOSPITAL OUTPT CLINIC VISIT: HCPCS

## 2025-05-05 PROCEDURE — 72200 X-RAY EXAM SI JOINTS: CPT

## 2025-05-05 RX ORDER — CYCLOBENZAPRINE HCL 5 MG
5 TABLET ORAL 3 TIMES DAILY PRN
Qty: 30 TABLET | Refills: 3 | Status: SHIPPED | OUTPATIENT
Start: 2025-05-05

## 2025-05-05 RX ORDER — LIDOCAINE 50 MG/G
1 PATCH TOPICAL EVERY 24 HOURS
Qty: 30 PATCH | Refills: 3 | Status: SHIPPED | OUTPATIENT
Start: 2025-05-05

## 2025-05-05 ASSESSMENT — PAIN SCALES - GENERAL: PAINLEVEL_OUTOF10: MILD PAIN (2)

## 2025-05-05 NOTE — NURSING NOTE
"Chief Complaint   Patient presents with    Pain     Low back, been off and on for the past 8 months, has been going to the chiropractor and has not helped, been icing her back only helps for a little bit.        Initial /89 (BP Location: Right arm, Patient Position: Sitting, Cuff Size: Adult Regular)   Pulse 76   Temp 97.5  F (36.4  C) (Temporal)   Resp 20   Ht 1.695 m (5' 6.75\")   Wt 97.5 kg (215 lb)   LMP 10/09/2021 (Approximate)   SpO2 96%   BMI 33.93 kg/m   Estimated body mass index is 33.93 kg/m  as calculated from the following:    Height as of this encounter: 1.695 m (5' 6.75\").    Weight as of this encounter: 97.5 kg (215 lb).  Medication Review: complete    The next two questions are to help us understand your food security.  If you are feeling you need any assistance in this area, we have resources available to support you today.          7/1/2024   SDOH- Food Insecurity   Within the past 12 months, did you worry that your food would run out before you got money to buy more? N    Within the past 12 months, did the food you bought just not last and you didn t have money to get more? N        Proxy-reported         Health Care Directive:  Patient has a Health Care Directive on file      Jenna Wyatt      "

## 2025-05-05 NOTE — PROGRESS NOTES
Assessment & Plan   Problem List Items Addressed This Visit    None  Visit Diagnoses         Chronic midline low back pain without sciatica    -  Primary    Relevant Medications    cyclobenzaprine (FLEXERIL) 5 MG tablet    lidocaine (LIDODERM) 5 % patch    Other Relevant Orders    XR Lumbar Spine 2/3 Views (Completed)    XR Sacroiliac Joint 1/2 Views (Completed)    MR Lumbar Spine w/o Contrast    Physical Therapy  Referral      Claustrophobia        Relevant Medications    diazepam (VALIUM) 5 MG tablet           Chronic and persisting despite supportive cares at home.   Xray shows advanced degeneration in lumbar spine and spondylolisthesis L5 on S1.   Referral to PT. Rx for lidocaine patch and flexeril to use prn in addition to OTC analgesics.   Will get MRI to further assess since she has essentially done conservative measures at home   Valium to take prior to MRI            Subjective   Johanna is a 59 year old, presenting for the following health issues:  Pain (Low back, been off and on for the past 8 months, has been going to the chiropractor and has not helped, been icing her back only helps for a little bit. )      5/5/2025     1:13 PM   Additional Questions   Roomed by Jenna VILLARREAL     Pain    History of Present Illness       Back Pain:  She presents for follow up of back pain. Patient's back pain is a chronic problem.  Location of back pain:  Right lower back and left lower back  Description of back pain: sharp, shooting and stabbing  Back pain spreads: nowhere    Since patient first noticed back pain, pain is: always present, but gets better and worse  Does back pain interfere with her job:  No      She is taking medications regularly.        Low back pain   - started 8 months ago   - treatment so far has been: chiropractor, tens unit, acupuncture, icing   - doing elliptical daily and feels good doing that   - sitting, raking, shoveling, leaning forward etc hurts her back   - no known accident or  "trauma to the area   - no fever, incontinence, or other red flag symptoms  - has been doing home strengthening exercises               Review of Systems  Constitutional, HEENT, cardiovascular, pulmonary, gi and gu systems are negative, except as otherwise noted.      Objective    /89 (BP Location: Right arm, Patient Position: Sitting, Cuff Size: Adult Regular)   Pulse 76   Temp 97.5  F (36.4  C) (Temporal)   Resp 20   Ht 1.695 m (5' 6.75\")   Wt 97.5 kg (215 lb)   LMP 10/09/2021 (Approximate)   SpO2 96%   BMI 33.93 kg/m    Body mass index is 33.93 kg/m .  Physical Exam   GENERAL: alert and no distress  Comprehensive back pain exam:  No tenderness, Range of motion not limited by pain, Lower extremity reflexes within normal limits bilaterally, Lower extremity sensation normal and equal on both sides, and Straight leg positive on  left, indicating possible ipsilateral radiculopathy  PSYCH: mentation appears normal, affect normal/bright    XR Sacroiliac Joint 1/2 Views  Result Date: 5/5/2025  PROCEDURE: XR SACROILIAC JOINT 1/2 VIEWS 5/5/2025 2:09 PM HISTORY: chronic low back to SI joint pain, worsening the last 8 months despite conservative measures.; Chronic midline low back pain without sciatica; Chronic midline low back pain without sciatica COMPARISONS: None. TECHNIQUE: 3 views FINDINGS: The sacrum and sacroiliac joints appear normal. The adjacent ileum is normal.        IMPRESSION: Normal sacroiliac joints. BITA WISDOM MD   SYSTEM ID:  [id number]    XR Lumbar Spine 2/3 Views  Result Date: 5/5/2025  PROCEDURE: XR LUMBAR SPINE 2/3 VIEWS 5/5/2025 2:09 PM HISTORY: chronic low back to SI joint pain, worsening the last 8 months despite conservative measures.; Chronic midline low back pain without sciatica; Chronic midline low back pain without sciatica COMPARISONS: None. TECHNIQUE: AP and lateral FINDINGS: The L2-L3, L3-L4, L4-L5 and L5-S1 discs are all decreased in height. There is spondylolisthesis of " L5 on S1/lipid right 9 mm. Lower lumbar facet joint degenerative changes are noted. There is scoliosis concave right. Sacrum and sacroiliac joints appear normal.        IMPRESSION: Advanced degenerative changes in the lumbar spine with spondylolisthesis of L5 on S1 BITA WISDOM MD   SYSTEM ID:  [id number]        Signed Electronically by: Jere Ye MD

## 2025-05-07 RX ORDER — DIAZEPAM 5 MG/1
TABLET ORAL
Qty: 2 TABLET | Refills: 0 | Status: SHIPPED | OUTPATIENT
Start: 2025-05-07

## 2025-05-12 ENCOUNTER — HOSPITAL ENCOUNTER (OUTPATIENT)
Dept: MAMMOGRAPHY | Facility: OTHER | Age: 60
Discharge: HOME | End: 2025-05-12
Attending: FAMILY MEDICINE | Admitting: FAMILY MEDICINE
Payer: COMMERCIAL

## 2025-05-12 DIAGNOSIS — Z92.89 HX OF SCREENING MAMMOGRAPHY: ICD-10-CM

## 2025-05-12 DIAGNOSIS — Z12.31 VISIT FOR SCREENING MAMMOGRAM: ICD-10-CM

## 2025-05-12 PROCEDURE — 77067 SCR MAMMO BI INCL CAD: CPT | Mod: 26 | Performed by: RADIOLOGY

## 2025-05-12 PROCEDURE — 77063 BREAST TOMOSYNTHESIS BI: CPT

## 2025-05-12 PROCEDURE — 77063 BREAST TOMOSYNTHESIS BI: CPT | Mod: 26 | Performed by: RADIOLOGY

## 2025-05-13 NOTE — HPI: SKIN LESIONS
Yes
Is This A New Presentation, Or A Follow-Up?: Skin Lesions
How Severe Is Your Skin Lesion?: mild
Have Your Skin Lesions Been Treated?: not been treated

## 2025-05-17 ENCOUNTER — HOSPITAL ENCOUNTER (OUTPATIENT)
Dept: MRI IMAGING | Facility: OTHER | Age: 60
Discharge: HOME | End: 2025-05-17
Attending: STUDENT IN AN ORGANIZED HEALTH CARE EDUCATION/TRAINING PROGRAM | Admitting: RADIOLOGY
Payer: COMMERCIAL

## 2025-05-17 DIAGNOSIS — G89.29 CHRONIC MIDLINE LOW BACK PAIN WITHOUT SCIATICA: ICD-10-CM

## 2025-05-17 DIAGNOSIS — M54.50 CHRONIC MIDLINE LOW BACK PAIN WITHOUT SCIATICA: ICD-10-CM

## 2025-05-17 PROCEDURE — 72148 MRI LUMBAR SPINE W/O DYE: CPT

## 2025-05-17 PROCEDURE — 72148 MRI LUMBAR SPINE W/O DYE: CPT | Mod: 26 | Performed by: RADIOLOGY

## 2025-05-19 ENCOUNTER — RESULTS FOLLOW-UP (OUTPATIENT)
Dept: FAMILY MEDICINE | Facility: OTHER | Age: 60
End: 2025-05-19

## 2025-05-19 ENCOUNTER — THERAPY VISIT (OUTPATIENT)
Dept: PHYSICAL THERAPY | Facility: OTHER | Age: 60
End: 2025-05-19
Attending: STUDENT IN AN ORGANIZED HEALTH CARE EDUCATION/TRAINING PROGRAM
Payer: COMMERCIAL

## 2025-05-19 DIAGNOSIS — G89.29 CHRONIC MIDLINE LOW BACK PAIN WITHOUT SCIATICA: ICD-10-CM

## 2025-05-19 DIAGNOSIS — M54.50 CHRONIC MIDLINE LOW BACK PAIN WITHOUT SCIATICA: ICD-10-CM

## 2025-05-19 PROCEDURE — 97112 NEUROMUSCULAR REEDUCATION: CPT | Mod: GP | Performed by: PHYSICAL THERAPIST

## 2025-05-19 PROCEDURE — 97161 PT EVAL LOW COMPLEX 20 MIN: CPT | Mod: GP | Performed by: PHYSICAL THERAPIST

## 2025-05-19 PROCEDURE — 97110 THERAPEUTIC EXERCISES: CPT | Mod: GP | Performed by: PHYSICAL THERAPIST

## 2025-05-19 NOTE — PROGRESS NOTES
PHYSICAL THERAPY EVALUATION  Type of Visit: Evaluation       Fall Risk Screen:  Have you fallen 2 or more times in the past year?: No  Have you fallen and had an injury in the past year?: No    Subjective         Presenting condition or subjective complaint: Lower back pain  Date of onset: 05/07/25 (date of referral, been dealing for a long time)    Relevant medical history: Arthritis; Menopause; Overweight   Dates & types of surgery: Knee surgery, carpal tunnel  Precautions: Social Support: With a significant other or spouse, Self Cares (home health aide/personal care attendant, family, etc);     Prior diagnostic imaging/testing results: X-ray     Prior therapy history for the same diagnosis, illness or injury: No      Prior level of function: independent  Employment: No  Retired  Hobbies/Interests: Gardening, camping    Patient goals for therapy: Everything    Pain assessment:  2/10 on average  Makes it worse: sitting long time then transitioning to standing (once standing is good), lifting, vacuuming, raking, certain positions, household tasks, turning over in bed.   Makes it better: walking, laying down       Objective   OBSERVATION: Lordotic posture with some resting hip flexion noted    SENSATION: intact to light touch    GAIT: slight lean to the right    PALPATION: Tender at lumbar paraspinals bilaterally lower near L5    Standing Lumbar ROM   Movement Restriction Symptoms   Flexion 0-10% Slight Decrease with repeated   Extension 10-25% Moderate Increase   Right SB 0-10% Moderate Increase   Left SB 0-10% Slight Increase   Right Rotation 0-10% No Change     Left Rotation 0-10% No Change       Hip PROM   Right Left    Ext-Flex 15-0-120 15-0-120 No pain in hips or with hip movement   ER90 60 60    IR90 40 40        Myotomes Comments   Hip Flexion (L2)    Knee Extension (L3)    Ankle DF (L4)    Great Toe DF (L5)    Ankle PF (S1)    Knee Flexion (S2)      Hip Strength   Right Left Comments   Flexion 4-/5, min  resist 4-/5, min resist    Extension Not Tested Not Tested    ABD 4-/5, min resist 4-/5, min resist      FUNCTIONAL TESTS:   Max side plank max 15 seconds B.  Mod forearm plank 1:20 max     Assessment & Plan   CLINICAL IMPRESSIONS  Medical Diagnosis: Chronic low back pain without sciatica    Treatment Diagnosis:     Impression/Assessment: Patient is a 59 year old female presenting with referring diagnosis of low back pain without sciatica.  The following significant findings have been identified: pain, ROM deficits, strength deficits, and decreased activity tolerance. These impairments interfere with their ability to perform walking, squatting, stairs, transfers, household work, self-cares, recreational activity, pushing, pulling, reaching, lifting, and bending as compared to previous level of function.     Clinical Decision Making (Complexity):  Clinical Presentation: Stable/Uncomplicated  Clinical Presentation Rationale: based on medical and personal factors listed in PT evaluation  Clinical Decision Making (Complexity): Low complexity    PLAN OF CARE  Treatment Interventions:  Modalities: Cryotherapy, Cupping, Dry Needling, E-stim, Hot Pack, Vasoneumatic Device  Interventions: Gait Training, Manual Therapy, Neuromuscular Re-education, Therapeutic Activity, Therapeutic Exercise, Aquatic Therapy    Long Term Goals     PT Goal 1  Goal Identifier: Short Term Goal 1  Goal Description: Patient will be able to bend down and touch toes with no increase in back pain.  Rationale: to maximize safety and independence with performance of ADLs and functional tasks  Target Date: 06/18/25  PT Goal 2  Goal Identifier: Short Term Goal 2  Goal Description: Patient will be able to hold modified side plank on each side for 30 seconds at least max  Rationale: to maximize safety and independence with performance of ADLs and functional tasks  Target Date: 06/18/25  PT Goal 3  Goal Identifier: Long Term Goal 1  Goal Description: Patient  will be able to rake or vacuum without more than 3/10 pain and no more than 1 break throughout the house.  Rationale: to maximize safety and independence within the home  Target Date: 08/11/25  PT Goal 4  Goal Identifier: Long Term Goal 2  Goal Description: Patient will be able to turn over in bed at any point without an incerase in pain in the low back greater than 2/10  Rationale: to maximize safety and independence with performance of ADLs and functional tasks  Target Date: 08/11/25      Frequency of Treatment: 1-2x/week  Duration of Treatment: 12 weeks    Education Assessment:   Learner/Method: Patient;Listening;Reading;Demonstration;Pictures/Video    Risks and benefits of evaluation/treatment have been explained.   Patient/Family/caregiver agrees with Plan of Care.     Evaluation Time:     PT Eval, Low Complexity Minutes (18739): 30     Signing Clinician: ANITA Pop [facility]                                                                                   OUTPATIENT PHYSICAL THERAPY      PLAN OF TREATMENT FOR OUTPATIENT REHABILITATION   Patient's Last Name, First Name, Johanna Lemos YOB: 1965   Provider's Name   [facility]   Medical Record No.  [id number]     Onset Date: 05/07/25 (date of referral, been dealing for a long time)  Start of Care Date: 05/19/25     Medical Diagnosis:  Chronic low back pain without sciatica      PT Treatment Diagnosis:    Plan of Treatment  Frequency/Duration: 1-2x/week/ 12 weeks    Certification date from 05/19/25 to 08/11/25         See note for plan of treatment details and functional goals     Billy Akbar, PT                         I CERTIFY THE NEED FOR THESE SERVICES FURNISHED UNDER        THIS PLAN OF TREATMENT AND WHILE UNDER MY CARE     (Physician attestation of this document indicates review and certification of the therapy plan).              Referring  Provider:  Jere Ye    Initial Assessment  See Epic Evaluation- Start of Care Date: 05/19/25

## 2025-05-28 ENCOUNTER — THERAPY VISIT (OUTPATIENT)
Dept: PHYSICAL THERAPY | Facility: OTHER | Age: 60
End: 2025-05-28
Attending: STUDENT IN AN ORGANIZED HEALTH CARE EDUCATION/TRAINING PROGRAM
Payer: COMMERCIAL

## 2025-05-28 DIAGNOSIS — G89.29 CHRONIC MIDLINE LOW BACK PAIN WITHOUT SCIATICA: Primary | ICD-10-CM

## 2025-05-28 DIAGNOSIS — M54.50 CHRONIC MIDLINE LOW BACK PAIN WITHOUT SCIATICA: Primary | ICD-10-CM

## 2025-05-28 PROCEDURE — 97110 THERAPEUTIC EXERCISES: CPT | Mod: GP | Performed by: PHYSICAL THERAPIST

## 2025-05-28 PROCEDURE — 97140 MANUAL THERAPY 1/> REGIONS: CPT | Mod: GP | Performed by: PHYSICAL THERAPIST

## 2025-05-28 PROCEDURE — 97112 NEUROMUSCULAR REEDUCATION: CPT | Mod: GP | Performed by: PHYSICAL THERAPIST

## 2025-06-04 ENCOUNTER — THERAPY VISIT (OUTPATIENT)
Dept: PHYSICAL THERAPY | Facility: OTHER | Age: 60
End: 2025-06-04
Attending: STUDENT IN AN ORGANIZED HEALTH CARE EDUCATION/TRAINING PROGRAM
Payer: COMMERCIAL

## 2025-06-04 DIAGNOSIS — M54.50 CHRONIC MIDLINE LOW BACK PAIN WITHOUT SCIATICA: Primary | ICD-10-CM

## 2025-06-04 DIAGNOSIS — G89.29 CHRONIC MIDLINE LOW BACK PAIN WITHOUT SCIATICA: Primary | ICD-10-CM

## 2025-06-04 PROCEDURE — 97530 THERAPEUTIC ACTIVITIES: CPT | Mod: GP | Performed by: PHYSICAL THERAPIST

## 2025-06-04 PROCEDURE — 97110 THERAPEUTIC EXERCISES: CPT | Mod: GP | Performed by: PHYSICAL THERAPIST

## 2025-06-05 SDOH — HEALTH STABILITY: PHYSICAL HEALTH: ON AVERAGE, HOW MANY DAYS PER WEEK DO YOU ENGAGE IN MODERATE TO STRENUOUS EXERCISE (LIKE A BRISK WALK)?: 6 DAYS

## 2025-06-05 SDOH — HEALTH STABILITY: PHYSICAL HEALTH: ON AVERAGE, HOW MANY MINUTES DO YOU ENGAGE IN EXERCISE AT THIS LEVEL?: 30 MIN

## 2025-06-05 ASSESSMENT — SOCIAL DETERMINANTS OF HEALTH (SDOH): HOW OFTEN DO YOU GET TOGETHER WITH FRIENDS OR RELATIVES?: ONCE A WEEK

## 2025-06-09 ASSESSMENT — ANXIETY QUESTIONNAIRES
GAD7 TOTAL SCORE: 0
3. WORRYING TOO MUCH ABOUT DIFFERENT THINGS: NOT AT ALL
7. FEELING AFRAID AS IF SOMETHING AWFUL MIGHT HAPPEN: NOT AT ALL
4. TROUBLE RELAXING: NOT AT ALL
IF YOU CHECKED OFF ANY PROBLEMS ON THIS QUESTIONNAIRE, HOW DIFFICULT HAVE THESE PROBLEMS MADE IT FOR YOU TO DO YOUR WORK, TAKE CARE OF THINGS AT HOME, OR GET ALONG WITH OTHER PEOPLE: NOT DIFFICULT AT ALL
8. IF YOU CHECKED OFF ANY PROBLEMS, HOW DIFFICULT HAVE THESE MADE IT FOR YOU TO DO YOUR WORK, TAKE CARE OF THINGS AT HOME, OR GET ALONG WITH OTHER PEOPLE?: NOT DIFFICULT AT ALL
GAD7 TOTAL SCORE: 0
2. NOT BEING ABLE TO STOP OR CONTROL WORRYING: NOT AT ALL
7. FEELING AFRAID AS IF SOMETHING AWFUL MIGHT HAPPEN: NOT AT ALL
GAD7 TOTAL SCORE: 0
5. BEING SO RESTLESS THAT IT IS HARD TO SIT STILL: NOT AT ALL
1. FEELING NERVOUS, ANXIOUS, OR ON EDGE: NOT AT ALL
6. BECOMING EASILY ANNOYED OR IRRITABLE: NOT AT ALL

## 2025-06-09 ASSESSMENT — PATIENT HEALTH QUESTIONNAIRE - PHQ9
10. IF YOU CHECKED OFF ANY PROBLEMS, HOW DIFFICULT HAVE THESE PROBLEMS MADE IT FOR YOU TO DO YOUR WORK, TAKE CARE OF THINGS AT HOME, OR GET ALONG WITH OTHER PEOPLE: NOT DIFFICULT AT ALL
SUM OF ALL RESPONSES TO PHQ QUESTIONS 1-9: 1
SUM OF ALL RESPONSES TO PHQ QUESTIONS 1-9: 1

## 2025-06-09 ASSESSMENT — PAIN SCALES - PAIN ENJOYMENT GENERAL ACTIVITY SCALE (PEG)
INTERFERED_GENERAL_ACTIVITY: 2
PEG_TOTALSCORE: 2.33
INTERFERED_GENERAL_ACTIVITY: 2
AVG_PAIN_PASTWEEK: 3
INTERFERED_ENJOYMENT_LIFE: 2
INTERFERED_ENJOYMENT_LIFE: 2
AVG_PAIN_PASTWEEK: 3
PEG_TOTALSCORE: 2.33

## 2025-06-10 ENCOUNTER — RESULTS FOLLOW-UP (OUTPATIENT)
Dept: FAMILY MEDICINE | Facility: OTHER | Age: 60
End: 2025-06-10

## 2025-06-10 ENCOUNTER — THERAPY VISIT (OUTPATIENT)
Dept: PHYSICAL THERAPY | Facility: OTHER | Age: 60
End: 2025-06-10
Attending: STUDENT IN AN ORGANIZED HEALTH CARE EDUCATION/TRAINING PROGRAM
Payer: COMMERCIAL

## 2025-06-10 ENCOUNTER — OFFICE VISIT (OUTPATIENT)
Dept: FAMILY MEDICINE | Facility: OTHER | Age: 60
End: 2025-06-10
Attending: FAMILY MEDICINE
Payer: COMMERCIAL

## 2025-06-10 VITALS
BODY MASS INDEX: 32.96 KG/M2 | HEIGHT: 67 IN | DIASTOLIC BLOOD PRESSURE: 80 MMHG | HEART RATE: 84 BPM | TEMPERATURE: 97.1 F | OXYGEN SATURATION: 98 % | WEIGHT: 210 LBS | RESPIRATION RATE: 16 BRPM | SYSTOLIC BLOOD PRESSURE: 130 MMHG

## 2025-06-10 DIAGNOSIS — M54.50 CHRONIC MIDLINE LOW BACK PAIN WITHOUT SCIATICA: Primary | ICD-10-CM

## 2025-06-10 DIAGNOSIS — Z00.00 PHYSICAL EXAM, ANNUAL: Primary | ICD-10-CM

## 2025-06-10 DIAGNOSIS — E66.811 CLASS 1 OBESITY WITHOUT SERIOUS COMORBIDITY WITH BODY MASS INDEX (BMI) OF 32.0 TO 32.9 IN ADULT, UNSPECIFIED OBESITY TYPE: ICD-10-CM

## 2025-06-10 DIAGNOSIS — K21.9 GASTROESOPHAGEAL REFLUX DISEASE WITHOUT ESOPHAGITIS: ICD-10-CM

## 2025-06-10 DIAGNOSIS — B00.1 RECURRENT COLD SORES: ICD-10-CM

## 2025-06-10 DIAGNOSIS — J30.1 SEASONAL ALLERGIC RHINITIS DUE TO POLLEN: ICD-10-CM

## 2025-06-10 DIAGNOSIS — M54.50 CHRONIC MIDLINE LOW BACK PAIN WITHOUT SCIATICA: ICD-10-CM

## 2025-06-10 DIAGNOSIS — K80.20 CALCULUS OF GALLBLADDER WITHOUT CHOLECYSTITIS WITHOUT OBSTRUCTION: ICD-10-CM

## 2025-06-10 DIAGNOSIS — G89.29 CHRONIC MIDLINE LOW BACK PAIN WITHOUT SCIATICA: Primary | ICD-10-CM

## 2025-06-10 DIAGNOSIS — G89.29 CHRONIC MIDLINE LOW BACK PAIN WITHOUT SCIATICA: ICD-10-CM

## 2025-06-10 DIAGNOSIS — F41.9 ANXIETY-LIKE SYMPTOMS: ICD-10-CM

## 2025-06-10 LAB
ALBUMIN SERPL BCG-MCNC: 4.5 G/DL (ref 3.5–5.2)
ALP SERPL-CCNC: 157 U/L (ref 40–150)
ALT SERPL W P-5'-P-CCNC: 25 U/L (ref 0–50)
ANION GAP SERPL CALCULATED.3IONS-SCNC: 14 MMOL/L (ref 7–15)
AST SERPL W P-5'-P-CCNC: 25 U/L (ref 0–45)
BILIRUB SERPL-MCNC: 0.5 MG/DL
BUN SERPL-MCNC: 10.6 MG/DL (ref 6–20)
CALCIUM SERPL-MCNC: 9.6 MG/DL (ref 8.8–10.4)
CHLORIDE SERPL-SCNC: 102 MMOL/L (ref 98–107)
CHOLEST SERPL-MCNC: 193 MG/DL
CREAT SERPL-MCNC: 0.73 MG/DL (ref 0.51–0.95)
EGFRCR SERPLBLD CKD-EPI 2021: >90 ML/MIN/1.73M2
FASTING STATUS PATIENT QL REPORTED: NO
FASTING STATUS PATIENT QL REPORTED: NO
GLUCOSE SERPL-MCNC: 87 MG/DL (ref 70–99)
HCO3 SERPL-SCNC: 23 MMOL/L (ref 22–29)
HDLC SERPL-MCNC: 93 MG/DL
HGB BLD-MCNC: 15.2 G/DL (ref 11.7–15.7)
LDLC SERPL CALC-MCNC: 86 MG/DL
MCV RBC AUTO: 85 FL (ref 78–100)
NONHDLC SERPL-MCNC: 100 MG/DL
POTASSIUM SERPL-SCNC: 3.9 MMOL/L (ref 3.4–5.3)
PROT SERPL-MCNC: 7.7 G/DL (ref 6.4–8.3)
SODIUM SERPL-SCNC: 139 MMOL/L (ref 135–145)
TRIGL SERPL-MCNC: 69 MG/DL

## 2025-06-10 PROCEDURE — 80053 COMPREHEN METABOLIC PANEL: CPT | Mod: ZL | Performed by: FAMILY MEDICINE

## 2025-06-10 PROCEDURE — 97110 THERAPEUTIC EXERCISES: CPT | Mod: GP | Performed by: PHYSICAL THERAPIST

## 2025-06-10 PROCEDURE — G0463 HOSPITAL OUTPT CLINIC VISIT: HCPCS | Performed by: FAMILY MEDICINE

## 2025-06-10 PROCEDURE — 36415 COLL VENOUS BLD VENIPUNCTURE: CPT | Mod: ZL | Performed by: FAMILY MEDICINE

## 2025-06-10 PROCEDURE — 85018 HEMOGLOBIN: CPT | Mod: ZL | Performed by: FAMILY MEDICINE

## 2025-06-10 PROCEDURE — 82465 ASSAY BLD/SERUM CHOLESTEROL: CPT | Mod: ZL | Performed by: FAMILY MEDICINE

## 2025-06-10 PROCEDURE — 97112 NEUROMUSCULAR REEDUCATION: CPT | Mod: GP | Performed by: PHYSICAL THERAPIST

## 2025-06-10 RX ORDER — OMEPRAZOLE 10 MG/1
20 CAPSULE, DELAYED RELEASE ORAL DAILY
Qty: 180 CAPSULE | Refills: 4 | Status: SHIPPED | OUTPATIENT
Start: 2025-06-10

## 2025-06-10 RX ORDER — GUAIFENESIN AND DEXTROMETHORPHAN HYDROBROMIDE 600; 30 MG/1; MG/1
1 TABLET, EXTENDED RELEASE ORAL EVERY 12 HOURS
Qty: 30 TABLET | Refills: 1 | Status: SHIPPED | OUTPATIENT
Start: 2025-06-10

## 2025-06-10 RX ORDER — FLUTICASONE PROPIONATE 50 MCG
2 SPRAY, SUSPENSION (ML) NASAL DAILY
Qty: 16 G | Refills: 11 | Status: SHIPPED | OUTPATIENT
Start: 2025-06-10

## 2025-06-10 ASSESSMENT — PAIN SCALES - GENERAL: PAINLEVEL_OUTOF10: NO PAIN (0)

## 2025-06-10 NOTE — NURSING NOTE
"Chief Complaint   Patient presents with    Physical     Annual well visit       Initial /80 (BP Location: Right arm, Patient Position: Sitting, Cuff Size: Adult Regular)   Pulse 84   Temp 97.1  F (36.2  C) (Temporal)   Resp 16   Ht 1.695 m (5' 6.75\")   Wt 95.3 kg (210 lb)   LMP 10/09/2021 (Approximate)   SpO2 98%   Breastfeeding No   BMI 33.14 kg/m   Estimated body mass index is 33.14 kg/m  as calculated from the following:    Height as of this encounter: 1.695 m (5' 6.75\").    Weight as of this encounter: 95.3 kg (210 lb).    Medication Review: complete    The next two questions are to help us understand your food security.  If you are feeling you need any assistance in this area, we have resources available to support you today.          6/5/2025   SDOH- Food Insecurity   Within the past 12 months, did you worry that your food would run out before you got money to buy more? N   Within the past 12 months, did the food you bought just not last and you didn t have money to get more? N     Patient has a Health Care Directive on file      Megan Sheets LPN      "

## 2025-06-10 NOTE — PATIENT INSTRUCTIONS
Daily protein intake:  0.8g/kg/day  0.37g/lb/day    150#:  55gm  175#:  64gm  200#:  73gm  225#:  82gm  250#:  90gm      Calorie and protein requirements -- Calorie needs (the estimated energy requirement [EER]) can be calculated in older adults using the following equations [82]:  ?For women: 354.1 - (6.91 x age [y]) + PAC x (9.36 x weight [kg] + 726 x height [m]).  ?For men: 661.8 - (9.53 x age [y]) + PAC x (15.91 x weight [kg] + 539.6 x height [m]).  The Physical Activity Coefficient (PAC) is determined as follows:  ?Sedentary PAC = 1.0  ?Low activity PAC = 1.12  ?Active PAC = 1.27  ?Very active PAC = 1.45

## 2025-06-10 NOTE — PROGRESS NOTES
Preventive Care Visit  [facility]  SERA LARA MD, Family Medicine  Saman 10, 2025      Assessment & Plan       ICD-10-CM    1. Physical exam, annual  Z00.00 Lipid Profile     Comprehensive Metabolic Panel     Hemoglobin      2. Calculus of gallbladder without cholecystitis without obstruction  K80.20       3. Class 1 obesity without serious comorbidity with body mass index (BMI) of 32.0 to 32.9 in adult, unspecified obesity type  E66.811     Z68.32       4. Gastroesophageal reflux disease without esophagitis  K21.9 omeprazole (PRILOSEC) 10 MG DR capsule      5. Recurrent cold sores  B00.1       6. Anxiety-like symptoms  F41.9       7. Chronic midline low back pain without sciatica  M54.50     G89.29       8. Seasonal allergic rhinitis due to pollen  J30.1 fluticasone (FLONASE) 50 MCG/ACT nasal spray     dextromethorphan-guaiFENesin (MUCINEX DM)  MG 12 hr tablet          Patient will continue to work on wt loss  Follow up gallbladder ultrasound this fall  Continue physical therapy for her back  PPI prn  Allergy treatment prn   Mammogram, pap smear and colon cancer screening up to date   Lab today  Ears  - back to normal now.          Counseling  Appropriate preventive services were addressed with this patient via screening, questionnaire, or discussion as appropriate for fall prevention, nutrition, physical activity, Tobacco-use cessation, social engagement, weight loss and cognition.  Checklist reviewing preventive services available has been given to the patient.  Reviewed patient's diet, addressing concerns and/or questions.       Sera Pate MD     Franky Spencer is a 59 year old, presenting for the following:  Physical (Annual well visit)        6/10/2025     8:27 AM   Additional Questions   Roomed by Megan DINH LPN          HPI  Johanna Cm is a 59 year old female in for her physical.      Weight management - feels that Wellbutrin and Glucophage has  helped her with control.  Using elliptical 30 minutes 6 d/week, gets sweaty with this.    Wt Readings from Last 5 Encounters:   06/10/25 95.3 kg (210 lb)   05/16/25 96.3 kg (212 lb 6.4 oz)   05/05/25 97.5 kg (215 lb)   03/11/25 94.3 kg (208 lb)   12/14/24 95.6 kg (210 lb 12.8 oz)   2.  Allergy symptoms   3.  Back - current physical therapy, this is helping   4.  Gallstones   - follow up ultrasound in September       Advance Care Planning    Document on file is a Health Care Directive or POLST.        6/5/2025   General Health   How would you rate your overall physical health? Good   Feel stress (tense, anxious, or unable to sleep) Not at all         6/5/2025   Nutrition   Three or more servings of calcium each day? Yes   Diet: Low fat/cholesterol   How many servings of fruit and vegetables per day? (!) 2-3   How many sweetened beverages each day? 0-1         6/5/2025   Exercise   Days per week of moderate/strenous exercise 6 days   Average minutes spent exercising at this level 30 min         6/5/2025   Social Factors   Frequency of gathering with friends or relatives Once a week   Worry food won't last until get money to buy more No   Food not last or not have enough money for food? No   Do you have housing? (Housing is defined as stable permanent housing and does not include staying outside in a car, in a tent, in an abandoned building, in an overnight shelter, or couch-surfing.) Yes   Are you worried about losing your housing? No   Lack of transportation? No   Unable to get utilities (heat,electricity)? No         6/5/2025   Fall Risk   Fallen 2 or more times in the past year? No   Trouble with walking or balance? No          6/5/2025   Dental   Dentist two times every year? Yes       Today's PHQ-9 Score:       6/9/2025     9:45 AM   PHQ-9 SCORE   PHQ-9 Total Score MyChart 1 (Minimal depression)   PHQ-9 Total Score 1        Patient-reported         6/5/2025   Substance Use   Alcohol more than 3/day or more than  7/wk No   Do you use any other substances recreationally? No     Social History     Tobacco Use    Smoking status: Never     Passive exposure: Never    Smokeless tobacco: Never   Vaping Use    Vaping status: Never Used   Substance Use Topics    Alcohol use: Yes     Comment: maybe 2 a week    Drug use: Never           5/12/2025   LAST FHS-7 RESULTS   1st degree relative breast or ovarian cancer No   Any relative bilateral breast cancer No   Any male have breast cancer No   Any ONE woman have BOTH breast AND ovarian cancer No   Any woman with breast cancer before 50yrs No   2 or more relatives with breast AND/OR ovarian cancer No   2 or more relatives with breast AND/OR bowel cancer No        Mammogram Screening - Mammogram every 1-2 years updated in Health Maintenance based on mutual decision making        6/5/2025   STI Screening   New sexual partner(s) since last STI/HIV test? No     History of abnormal Pap smear: No - age 30- 64 PAP with HPV every 5 years recommended       ASCVD Risk   The 10-year ASCVD risk score (Joe MCINTYRE, et al., 2019) is: 1.9%    Values used to calculate the score:      Age: 59 years      Sex: Female      Is Non- : No      Diabetic: No      Tobacco smoker: No      Systolic Blood Pressure: 130 mmHg      Is BP treated: No      HDL Cholesterol: 78 mg/dL      Total Cholesterol: 178 mg/dL           Reviewed and updated as needed this visit by Provider         Jose Reardon            Past Medical History:   Diagnosis Date    Epilepsy (H)     off meds since age 14    History of skin cancer 03/2024    SCC calf    Obesity     Primary osteoarthritis of both knees      Past Surgical History:   Procedure Laterality Date    BILATERAL KNEE ARTHROSCOPY Bilateral     ENDOSCOPIC RELEASE CARPAL TUNNEL Bilateral 8/23/2024    Procedure: Endoscopic release carpal tunnel;  Surgeon: Stan Regalado MD;  Location: [facility] OR    right knee arthropscopy Right          Review of  "Systems  Recent vision change  - left eye with a cloud of vision and went to [facility] ; vision   GI - no additional gb symptoms   Using metamucil   - helps her feel [place]   Ears - felt like she had fluid in her ears     Lab Results   Component Value Date    A1C 4.9 03/11/2025    A1C 5.1 07/14/2023         Objective    Exam  /80 (BP Location: Right arm, Patient Position: Sitting, Cuff Size: Adult Regular)   Pulse 84   Temp 97.1  F (36.2  C) (Temporal)   Resp 16   Ht 1.695 m (5' 6.75\")   Wt 95.3 kg (210 lb)   LMP 10/09/2021 (Approximate)   SpO2 98%   Breastfeeding No   BMI 33.14 kg/m     Estimated body mass index is 33.14 kg/m  as calculated from the following:    Height as of this encounter: 1.695 m (5' 6.75\").    Weight as of this encounter: 95.3 kg (210 lb).    Physical Exam  GENERAL: alert and no distress  EYES: Eyes grossly normal to inspection, PERRL and conjunctivae and sclerae normal  HENT: ear canals and TM's normal, nose and mouth without ulcers or lesions  NECK: no adenopathy, no asymmetry, masses, or scars  RESP: lungs clear to auscultation - no rales, rhonchi or wheezes  CV: regular rate and rhythm, normal S1 S2, no S3 or S4, no murmur, click or rub, no peripheral edema  ABDOMEN: soft, nontender, no hepatosplenomegaly, no masses and bowel sounds normal  MS: no gross musculoskeletal defects noted, no edema  SKIN: no suspicious lesions or rashes  NEURO: Normal strength and tone, mentation intact and speech normal  PSYCH: mentation appears normal, affect normal/bright        Signed Electronically by: MARIAJOSE LARA MD    Answers submitted by the patient for this visit:  Patient Health Questionnaire (Submitted on 6/9/2025)  If you checked off any problems, how difficult have these problems made it for you to do your work, take care of things at home, or get along with other people?: Not difficult at all  PHQ9 TOTAL SCORE: 1  Patient Health Questionnaire (G7) (Submitted on " 6/9/2025)  SHANNON 7 TOTAL SCORE: 0

## 2025-06-13 DIAGNOSIS — K80.20 CALCULUS OF GALLBLADDER WITHOUT CHOLECYSTITIS WITHOUT OBSTRUCTION: ICD-10-CM

## 2025-06-15 RX ORDER — URSODIOL 300 MG/1
300 CAPSULE ORAL 2 TIMES DAILY
Qty: 180 CAPSULE | Refills: 1 | Status: SHIPPED | OUTPATIENT
Start: 2025-06-15

## 2025-06-17 ENCOUNTER — THERAPY VISIT (OUTPATIENT)
Dept: PHYSICAL THERAPY | Facility: OTHER | Age: 60
End: 2025-06-17
Attending: STUDENT IN AN ORGANIZED HEALTH CARE EDUCATION/TRAINING PROGRAM
Payer: COMMERCIAL

## 2025-06-17 DIAGNOSIS — G89.29 CHRONIC MIDLINE LOW BACK PAIN WITHOUT SCIATICA: Primary | ICD-10-CM

## 2025-06-17 DIAGNOSIS — M54.50 CHRONIC MIDLINE LOW BACK PAIN WITHOUT SCIATICA: Primary | ICD-10-CM

## 2025-06-17 PROCEDURE — 97110 THERAPEUTIC EXERCISES: CPT | Mod: GP | Performed by: PHYSICAL THERAPIST

## 2025-09-04 DIAGNOSIS — J30.1 SEASONAL ALLERGIC RHINITIS DUE TO POLLEN: ICD-10-CM

## 2025-09-04 RX ORDER — GUAIFENESIN, DEXTROMETHORPHAN HBR 30; 600 MG/1; MG/1
1 TABLET, EXTENDED RELEASE ORAL EVERY 12 HOURS
Qty: 30 TABLET | Refills: 1 | Status: SHIPPED | OUTPATIENT
Start: 2025-09-04

## (undated) DEVICE — SU ETHILON 4-0 FS-2 18" 662H

## (undated) DEVICE — DRSG GAUZE 4X4" TRAY 6939

## (undated) DEVICE — DRAPE STERI TOWEL LG 1010

## (undated) DEVICE — PACK MAJOR EXTREMITY SOP15MEFCA

## (undated) DEVICE — BLADE CARPAL TUNNEL ENDO 81010-1

## (undated) DEVICE — DRSG XEROFORM 1X8"

## (undated) DEVICE — GLOVE PROTEXIS POWDER FREE SMT 8.5 2D72PT85X

## (undated) DEVICE — GLOVE SENSICARE 8.5 MSG1085 LATEX FREE

## (undated) DEVICE — PREP CHLORAPREP 26ML TINTED ORANGE  260815

## (undated) DEVICE — BNDG ELASTIC 2"X5YDS UNSTERILE 6611-20

## (undated) DEVICE — CAST PADDING-STERILE 2"

## (undated) DEVICE — TOURNIQUET SGL BLADDER 18"X4" RED 5921-218-135

## (undated) RX ORDER — LIDOCAINE HYDROCHLORIDE 10 MG/ML
INJECTION, SOLUTION INFILTRATION; PERINEURAL
Status: DISPENSED
Start: 2024-08-23

## (undated) RX ORDER — PROPOFOL 10 MG/ML
INJECTION, EMULSION INTRAVENOUS
Status: DISPENSED
Start: 2024-08-23

## (undated) RX ORDER — BUPIVACAINE HYDROCHLORIDE 2.5 MG/ML
INJECTION, SOLUTION EPIDURAL; INFILTRATION; INTRACAUDAL
Status: DISPENSED
Start: 2024-08-23

## (undated) RX ORDER — KETOROLAC TROMETHAMINE 30 MG/ML
INJECTION, SOLUTION INTRAMUSCULAR; INTRAVENOUS
Status: DISPENSED
Start: 2024-08-23